# Patient Record
Sex: MALE | Race: BLACK OR AFRICAN AMERICAN | NOT HISPANIC OR LATINO | Employment: OTHER | URBAN - METROPOLITAN AREA
[De-identification: names, ages, dates, MRNs, and addresses within clinical notes are randomized per-mention and may not be internally consistent; named-entity substitution may affect disease eponyms.]

---

## 2020-10-27 ENCOUNTER — HOSPITAL ENCOUNTER (EMERGENCY)
Facility: HOSPITAL | Age: 55
Discharge: HOME | End: 2020-10-27
Attending: EMERGENCY MEDICINE
Payer: MEDICARE

## 2020-10-27 VITALS
BODY MASS INDEX: 35.65 KG/M2 | DIASTOLIC BLOOD PRESSURE: 80 MMHG | WEIGHT: 249 LBS | HEART RATE: 80 BPM | HEIGHT: 70 IN | TEMPERATURE: 99 F | OXYGEN SATURATION: 100 % | RESPIRATION RATE: 16 BRPM | SYSTOLIC BLOOD PRESSURE: 138 MMHG

## 2020-10-27 DIAGNOSIS — H10.9 CONJUNCTIVITIS OF LEFT EYE, UNSPECIFIED CONJUNCTIVITIS TYPE: ICD-10-CM

## 2020-10-27 DIAGNOSIS — H21.562 PUPIL IRREGULAR OF LEFT EYE: Primary | ICD-10-CM

## 2020-10-27 PROCEDURE — 99284 PR EMERGENCY DEPT VISIT,LEVEL IV: ICD-10-PCS | Mod: GC,,, | Performed by: EMERGENCY MEDICINE

## 2020-10-27 PROCEDURE — 99284 EMERGENCY DEPT VISIT MOD MDM: CPT | Mod: GC,,, | Performed by: EMERGENCY MEDICINE

## 2020-10-27 PROCEDURE — 99284 EMERGENCY DEPT VISIT MOD MDM: CPT

## 2020-10-27 RX ORDER — TETRACAINE HYDROCHLORIDE 5 MG/ML
2 SOLUTION OPHTHALMIC
Status: DISCONTINUED | OUTPATIENT
Start: 2020-10-27 | End: 2020-10-27 | Stop reason: HOSPADM

## 2020-10-27 NOTE — DISCHARGE INSTRUCTIONS
Please see your ophthalmologist as soon as possible. Today it looks like your eye was irritated which should slowly improve with the drops prescribed to you today.

## 2020-10-27 NOTE — CONSULTS
Chief complaint/Reason for Consult: abnormal eye exam     History of Present Illness: Dallas Lawson Jr. is a 54 y.o. male with unknown past ocular hx, transferred from [place] for abnormal eye exam. Patient awoke this morning with FBS in L eye and tried to rinse out eye with water. Later that morning, his niece noticed his eye was red and he presented to [facility] ED for evaluation. At outside hospital, L pupil found to be fixed, irregular, dilated. Other than redness and slight irritation, patient denies pain. No sick contacts with pink eye. Vision is unchanged from baseline although is poor (CF // HM). Patient denies any other ocular or visual complaints. Does not use any eye drops. Does not know when his last eye exam was performed. Possibly follows with a Dr. Nash in the community.     Past Ocular Hx: no past ocular surgeries, does not use glasses or contacts     Current eye gtts: none      PMHx:  has a past medical history of Diabetes mellitus type I.     PSurgHx:  has no past surgical history on file.     Home Medications:   Prior to Admission medications    Medication Sig Start Date End Date Taking? Authorizing Provider   azithromycin (Z-NENO) 250 MG tablet Take 1 tablet (250 mg total) by mouth once daily. 1 pack, take as directed 1/2/15   José Antonio Bassett MD   capsaicin (ZOSTRIX) 0.025 % cream Apply topically 2 (two) times daily. 4/29/14   José Antonio Bassett MD   etodolac (LODINE) 400 MG tablet Take 1 tablet (400 mg total) by mouth 2 (two) times daily. 8/8/14   José Antonio Bassett MD   fluoxetine (PROZAC) 20 MG tablet Take 20 mg by mouth once daily.    Historical Provider   ibuprofen (ADVIL,MOTRIN) 800 MG tablet Take 800 mg by mouth every 6 (six) hours as needed.    Historical Provider   levothyroxine (SYNTHROID) 50 MCG tablet Take 1 tablet (50 mcg total) by mouth once daily. 8/8/14 8/8/15  José Antonio Bassett MD   methocarbamol (ROBAXIN) 750 MG Tab Take 1 tablet (750 mg total) by mouth 2 (two) times daily.  8/8/14   José Antonio Bassett MD   naproxen (NAPROSYN) 500 MG tablet Take 1 tablet (500 mg total) by mouth 2 (two) times daily. 12/10/14   Chau Centeno MD   NASONEX 50 mcg/actuation nasal spray 2 SPRAYS BY NASAL ROUTE ONCE DAILY. X 7 DAYS, THEN 1 SPRAY EACH NOSTRIL DAILY 12/30/14   José Antonio Bassett MD   omeprazole (PRILOSEC) 20 MG capsule Take 1 capsule (20 mg total) by mouth once daily. 8/8/14   José Antonio Bassett MD   quetiapine (SEROQUEL) 25 MG Tab Take 1 tablet (25 mg total) by mouth daily as needed. 8/8/14   José Antonio Bassett MD   ranitidine (ZANTAC) 150 MG capsule Take 150 mg by mouth 2 (two) times daily.    Historical Provider   tizanidine (ZANAFLEX) 4 MG tablet Take 1 tablet (4 mg total) by mouth every 8 (eight) hours as needed (Muscle spasm). DO NOT DRIVE AFTER TAKING MEDICATION 12/10/14   Chau Centeno MD   tramadol (ULTRAM) 50 mg tablet Take 1 tablet (50 mg total) by mouth every 6 (six) hours as needed for Pain. 12/10/14   Chau Centeno MD        Medications this encounter:    fluorescein  1 strip Both Eyes ED 1 Time    tetracaine HCl (PF)  2 drop Both Eyes ED 1 Time       Allergies: has No Known Allergies.     Social Hx:  reports that he has been smoking. He does not have any smokeless tobacco history on file. He reports that he does not drink alcohol or use drugs.     Family Hx: No family history of glaucoma. family history is not on file.     ROS: Negative x 10 except for complaints as described in HPI; negative for fever, chills, weight loss, nausea, vomiting, diarrhea, shortness of breath, nasal discharge, cough, abdominal pain, dyspnea, difficulty moving arms and legs, confusion, dysuria, palpitations, or chest pain     Ocular examination/Dilated fundus examination:  Base Eye Exam     Visual Acuity       Right Left    Near sc CF at 2 ft  HM          Tonometry (Applanation, 3:41 PM)       Right Left    Pressure 26 29          Pupils       Dark Light Shape React APD    Right 3 2 Round 2 apd by reverse    Left 6 6 Irregular  Minimal None   Possible inferior iris tear           Visual Fields       Right Left     Full Full          Extraocular Movement       Right Left      sl esotropia      -1 -1 -1   -1  -1   -1 -1 -1    -1 -1 -1   -1  -1   -1 -1 -1             Dilation     Both eyes: 1% Mydriacyl, 2.5% Phenylephrine @ 3:41 PM            Slit Lamp and Fundus Exam     External Exam       Right Left    External Normal Normal          Slit Lamp Exam       Right Left    Lids/Lashes Normal scant discharge    Conjunctiva/Sclera Trace Injection 1+ Injection    Cornea inferior PEEs, tear film debris  inferior PEEs, tear film debris     Anterior Chamber Deep and quiet Deep and quiet    Iris Round and reactive irregular fixed    Lens Trace Nuclear sclerosis, Cortical cataract Trace Nuclear sclerosis, Cortical cataract    Vitreous Normal Normal          Fundus Exam       Right Left    Disc tilted, pale tilted, pale     Macula Extensive, patchy chorioretinal atrophy with pigmentary changes encompassing majority of posterior pole Extensive, patchy chorioretinal atrophy with pigmentary changes encompassing majority of posterior pole    Vessels attenuated  attenuated     Periphery no holes, tears, detachments  no holes, tears, detachments                 Assessment/Plan:     1. Conjunctivitis, L eye   -patient with mild FBS, redness in L eye noticed by family members earlier today  -on exam, slight conj injection with scant discharge, no lesions of the cornea   -hx and exam most consistent with conjunctivitis, most likely viral   -please have patient begin preservative free artifical tears qid L eye for comfort  -advise patient to practice good hygiene, avoid touching face, frequent hand washing, no sharing towel  -will have patient return to eye clinic in 1-2 weeks to check for resolution of symptoms/establish eye care    2. Irregular pupil, L eye   - patient with irregular, dilated, minimally reactive pupil on examination first noticed in outside  hospital  -no headache, no ptosis, no 3rd nerve palsy. Patient noting possible remote trauma to eye in past, likely iris tear  -no need for further work up imaging at this time, likely chronic, will continue to follow     3. Pathologic myopia, both eyes   -patient with significant chorioretinal atrophy and tilted disc both eyes   -most likely pathologic myopia, other degenerative changes possible   -further work up outpt     4. Ocular hypertension possible glaucoma suspect, both eyes   -IOP 26//29 on examination today  -AA, unknown family hx. Not currently on drops   -recommend starting timolol bid ou and work up for glaucoma outpt    Discussed patient's history, physical, assessment and plan with Dr. Marty Montez MD  [employer] [facility] PGY-2

## 2020-10-27 NOTE — ED NOTES
Dallas Lawson Jr., an 54 y.o. male presents to the ED for an ophthalmology consult.  Patient woke up this morning with a blister on his left eyeball.  Patient is blind at baseline, only able to make out silhouettes with his left eye but now he can't see anything.        Review of patient's allergies indicates:  No Known Allergies  Chief Complaint   Patient presents with    Transfer     [facility] consult     Past Medical History:   Diagnosis Date    Diabetes mellitus type I

## 2020-10-27 NOTE — ED PROVIDER NOTES
Encounter Date: 10/27/2020       History     Chief Complaint   Patient presents with    Transfer     [facility] optho consult     HPI   55 yo man with hx of diabetes type 1 and poor vision bilaterally presenting with change in appearance of L eye since this morning. Pt woke up this morning around 4 am and felt mild discomfort of the L eye that has persisted. Niece told patient that his eye looked funny and so took him to [facility]. At [facility] noted to haven nonreactive, dilated, irregular pupil with discharge. Baseline vision in L eye is to see motion and light and pt says this remains unchanged. Baseline vision in R eye is worse, maybe sees motion sometimes. R eye vision unchanged as well. No recent symptoms such as cold, fevers, chills, nasal conjunction, ear pain. No hx of glaucoma. No nausea, vomiting, abd pain, head pain.   Review of patient's allergies indicates:  No Known Allergies  Past Medical History:   Diagnosis Date    Diabetes mellitus type I      No past surgical history on file.  No family history on file.  Social History     Tobacco Use    Smoking status: Current Every Day Smoker   Substance Use Topics    Alcohol use: No    Drug use: No     Review of Systems   Constitutional: Negative for chills and fever.   HENT: Negative for congestion and sore throat.    Eyes: Positive for pain (mild) and discharge. Negative for photophobia, redness, itching and visual disturbance.   Respiratory: Negative for cough and shortness of breath.    Cardiovascular: Negative for chest pain, palpitations and leg swelling.   Gastrointestinal: Negative for blood in stool, constipation, diarrhea, nausea and vomiting.   Genitourinary: Negative for dysuria, frequency and hematuria.   Musculoskeletal: Negative for back pain and neck stiffness.   Skin: Negative for rash and wound.   Neurological: Negative for dizziness, tremors, seizures, syncope, facial asymmetry, speech difficulty, weakness, light-headedness, numbness and  headaches.       Physical Exam     Initial Vitals [10/27/20 1415]   BP Pulse Resp Temp SpO2   (!) 142/92 88 16 98.6 °F (37 °C) 100 %      MAP       --         Physical Exam    Nursing note and vitals reviewed.  Constitutional: He appears well-developed and well-nourished. He is not diaphoretic.   Answering questions in full sentences without increased work of breathing.    HENT:   Head: Normocephalic.   Eyes: EOM are normal. Right eye exhibits no discharge. Left eye exhibits no discharge. No scleral icterus.   Erythematous conjunctiva L eye. Dilated, irregular pupil 7mm, nonreactive. Mild amount of cloudy discharge from L eye. R eye 3mm round pupil, reactive, conjunctiva clear. Pt able to count fingers and see light easily with both eyes (pt reports this is at baseline).    Neck: Normal range of motion. Neck supple. No tracheal deviation present.   Cardiovascular: Normal rate and regular rhythm. Exam reveals no gallop and no friction rub.    No murmur heard.  Pulmonary/Chest: No stridor. No respiratory distress. He has no wheezes. He has no rhonchi. He has no rales. He exhibits no tenderness.   Abdominal: Soft. Bowel sounds are normal. He exhibits no distension. There is no abdominal tenderness. There is no rebound and no guarding.   Musculoskeletal: No edema.   Neurological: He is alert and oriented to person, place, and time. GCS score is 15. GCS eye subscore is 4. GCS verbal subscore is 5. GCS motor subscore is 6.   Moving all extremities   Skin: Skin is warm and dry. Capillary refill takes less than 2 seconds.         ED Course   Procedures  Labs Reviewed - No data to display       Imaging Results    None          Medical Decision Making:   History:   I obtained history from: someone other than patient.       <> Summary of History: Niece, EMS, mother  Old Medical Records: I decided to obtain old medical records.  Old Records Summarized: records from another hospital.  Initial Assessment:   53 yo hemodynamically  "stable pt with irregular, dilated, nonreactive pupil and erythematous conjunctivitis L eye. Pt reports waking up with discomfort and washing and rubbing eye this morning. Niece also reporting that pt has been "messing with eye all morning".   Differential Diagnosis:   Glaucoma, pre-septal vs orbital cellulitis, conjunctivitis, isolated cranial nerve deficit secondary to intracranial pathology (e.g. mass/bleeding), corneal abrasion   ED Management:  Optho called when patient arrived. Optho recommending natural tears for conjunctivitis. They believe the abnormalities pt has in his eye are chronic other than conjunctivitis. Erythema, irritation may have been exacerbated by pt washing out/rubbing eye this morning. Educated on using only tear drops when eye irritated. Pt will call his eye doctor to have eye re-assessed by this physician who will know his baseline as well.   Other:   I have discussed this case with another health care provider.                             Clinical Impression:     ICD-10-CM ICD-9-CM   1. Pupil irregular of left eye  H21.562 364.75   2. Conjunctivitis of left eye, unspecified conjunctivitis type  H10.9 372.30                          ED Disposition Condition    Discharge Stable        ED Prescriptions     Medication Sig Dispense Start Date End Date Auth. Provider    dextran 70-hypromellose (TEARS) ophthalmic solution  (Status: Discontinued) Place 1 drop into the left eye as needed. 30 mL 10/27/2020 10/27/2020 Carlene Dorsey MD    dextran 70-hypromellose (TEARS) ophthalmic solution Place 1 drop into the left eye as needed. 30 mL 10/27/2020  Nata Che MD        Follow-up Information     Follow up With Specialties Details Why Contact Info Additional Information    [facility] [address] Ophthalmology Schedule an appointment as soon as possible for a visit   [address]  [facility] [address] [phone] Please arrive on the 1st floor near financial services " for check-in    [facility] Emergency Medicine Go to  Return to an emergency department immediately if you develop persisting or worsening symptoms or with any new symptoms such as fevers, chills, inability to eat or drink, uncontrollable pain, headaches, chagnes in vision, nausea, vomiting, stomach pain [address] [facility] [facility] [address]  [phone]                                        Nata Che MD  Resident  10/27/20 4654

## 2021-03-13 ENCOUNTER — IMMUNIZATION (OUTPATIENT)
Dept: PRIMARY CARE CLINIC | Facility: CLINIC | Age: 56
End: 2021-03-13
Payer: MEDICARE

## 2021-03-13 DIAGNOSIS — Z23 NEED FOR VACCINATION: Primary | ICD-10-CM

## 2021-03-13 PROCEDURE — 0001A PR IMMUNIZ ADMIN, SARS-COV-2 COVID-19 VACC, 30MCG/0.3ML, 1ST DOSE: CPT | Mod: CV19,S$GLB,, | Performed by: INTERNAL MEDICINE

## 2021-03-13 PROCEDURE — 91300 PR SARS-COV- 2 COVID-19 VACCINE, NO PRSV, 30MCG/0.3ML, IM: CPT | Mod: S$GLB,,, | Performed by: INTERNAL MEDICINE

## 2021-03-13 PROCEDURE — 91300 PR SARS-COV- 2 COVID-19 VACCINE, NO PRSV, 30MCG/0.3ML, IM: ICD-10-PCS | Mod: S$GLB,,, | Performed by: INTERNAL MEDICINE

## 2021-03-13 PROCEDURE — 0001A PR IMMUNIZ ADMIN, SARS-COV-2 COVID-19 VACC, 30MCG/0.3ML, 1ST DOSE: ICD-10-PCS | Mod: CV19,S$GLB,, | Performed by: INTERNAL MEDICINE

## 2021-03-13 RX ADMIN — Medication 0.3 ML: at 12:03

## 2023-08-10 ENCOUNTER — LAB VISIT (OUTPATIENT)
Dept: LAB | Facility: HOSPITAL | Age: 58
End: 2023-08-10
Attending: FAMILY MEDICINE
Payer: MEDICARE

## 2023-08-10 ENCOUNTER — OFFICE VISIT (OUTPATIENT)
Dept: FAMILY MEDICINE | Facility: CLINIC | Age: 58
End: 2023-08-10
Payer: MEDICARE

## 2023-08-10 VITALS
BODY MASS INDEX: 37 KG/M2 | TEMPERATURE: 99 F | HEIGHT: 69 IN | SYSTOLIC BLOOD PRESSURE: 122 MMHG | OXYGEN SATURATION: 96 % | HEART RATE: 66 BPM | DIASTOLIC BLOOD PRESSURE: 78 MMHG | RESPIRATION RATE: 16 BRPM | WEIGHT: 249.81 LBS

## 2023-08-10 DIAGNOSIS — Z86.39 HISTORY OF HYPOTHYROIDISM: ICD-10-CM

## 2023-08-10 DIAGNOSIS — M21.42 FLAT FEET, BILATERAL: ICD-10-CM

## 2023-08-10 DIAGNOSIS — R25.2 LEG CRAMPS: ICD-10-CM

## 2023-08-10 DIAGNOSIS — J31.0 CHRONIC RHINITIS: ICD-10-CM

## 2023-08-10 DIAGNOSIS — E78.5 DYSLIPIDEMIA: ICD-10-CM

## 2023-08-10 DIAGNOSIS — E66.9 TYPE 2 DIABETES MELLITUS WITH OBESITY: Primary | ICD-10-CM

## 2023-08-10 DIAGNOSIS — E11.69 TYPE 2 DIABETES MELLITUS WITH OBESITY: Primary | ICD-10-CM

## 2023-08-10 DIAGNOSIS — L60.3 ONYCHODYSTROPHY: ICD-10-CM

## 2023-08-10 DIAGNOSIS — E66.9 TYPE 2 DIABETES MELLITUS WITH OBESITY: ICD-10-CM

## 2023-08-10 DIAGNOSIS — M21.41 FLAT FEET, BILATERAL: ICD-10-CM

## 2023-08-10 DIAGNOSIS — Z13.5 SCREENING FOR DIABETIC RETINOPATHY: ICD-10-CM

## 2023-08-10 DIAGNOSIS — J34.2 NASAL SEPTAL DEVIATION: ICD-10-CM

## 2023-08-10 DIAGNOSIS — E11.69 TYPE 2 DIABETES MELLITUS WITH OBESITY: ICD-10-CM

## 2023-08-10 LAB
ALBUMIN SERPL BCP-MCNC: 4.3 G/DL (ref 3.5–5.2)
ALP SERPL-CCNC: 55 U/L (ref 55–135)
ALT SERPL W/O P-5'-P-CCNC: 66 U/L (ref 10–44)
ANION GAP SERPL CALC-SCNC: 7 MMOL/L (ref 8–16)
AST SERPL-CCNC: 42 U/L (ref 10–40)
BASOPHILS # BLD AUTO: 0.05 K/UL (ref 0–0.2)
BASOPHILS NFR BLD: 1 % (ref 0–1.9)
BILIRUB SERPL-MCNC: 0.6 MG/DL (ref 0.1–1)
BUN SERPL-MCNC: 18 MG/DL (ref 6–20)
CALCIUM SERPL-MCNC: 10.1 MG/DL (ref 8.7–10.5)
CHLORIDE SERPL-SCNC: 104 MMOL/L (ref 95–110)
CHOLEST SERPL-MCNC: 180 MG/DL (ref 120–199)
CHOLEST/HDLC SERPL: 3.9 {RATIO} (ref 2–5)
CO2 SERPL-SCNC: 31 MMOL/L (ref 23–29)
CREAT SERPL-MCNC: 1.2 MG/DL (ref 0.5–1.4)
DIFFERENTIAL METHOD: ABNORMAL
EOSINOPHIL # BLD AUTO: 0.2 K/UL (ref 0–0.5)
EOSINOPHIL NFR BLD: 3 % (ref 0–8)
ERYTHROCYTE [DISTWIDTH] IN BLOOD BY AUTOMATED COUNT: 14.3 % (ref 11.5–14.5)
EST. GFR  (NO RACE VARIABLE): >60 ML/MIN/1.73 M^2
GLUCOSE SERPL-MCNC: 103 MG/DL (ref 70–110)
GLUCOSE SERPL-MCNC: 108 MG/DL (ref 70–110)
HCT VFR BLD AUTO: 46.8 % (ref 40–54)
HDLC SERPL-MCNC: 46 MG/DL (ref 40–75)
HDLC SERPL: 25.6 % (ref 20–50)
HGB BLD-MCNC: 14.5 G/DL (ref 14–18)
IMM GRANULOCYTES # BLD AUTO: 0.01 K/UL (ref 0–0.04)
IMM GRANULOCYTES NFR BLD AUTO: 0.2 % (ref 0–0.5)
LDLC SERPL CALC-MCNC: 118.4 MG/DL (ref 63–159)
LYMPHOCYTES # BLD AUTO: 2.5 K/UL (ref 1–4.8)
LYMPHOCYTES NFR BLD: 48.5 % (ref 18–48)
MAGNESIUM SERPL-MCNC: 2.1 MG/DL (ref 1.6–2.6)
MCH RBC QN AUTO: 29.1 PG (ref 27–31)
MCHC RBC AUTO-ENTMCNC: 31 G/DL (ref 32–36)
MCV RBC AUTO: 94 FL (ref 82–98)
MONOCYTES # BLD AUTO: 0.6 K/UL (ref 0.3–1)
MONOCYTES NFR BLD: 11.7 % (ref 4–15)
NEUTROPHILS # BLD AUTO: 1.8 K/UL (ref 1.8–7.7)
NEUTROPHILS NFR BLD: 35.6 % (ref 38–73)
NONHDLC SERPL-MCNC: 134 MG/DL
NRBC BLD-RTO: 0 /100 WBC
PHOSPHATE SERPL-MCNC: 3.4 MG/DL (ref 2.7–4.5)
PLATELET # BLD AUTO: 198 K/UL (ref 150–450)
PMV BLD AUTO: 11.6 FL (ref 9.2–12.9)
POTASSIUM SERPL-SCNC: 4.8 MMOL/L (ref 3.5–5.1)
PROT SERPL-MCNC: 8.1 G/DL (ref 6–8.4)
RBC # BLD AUTO: 4.99 M/UL (ref 4.6–6.2)
SODIUM SERPL-SCNC: 142 MMOL/L (ref 136–145)
TRIGL SERPL-MCNC: 78 MG/DL (ref 30–150)
TSH SERPL DL<=0.005 MIU/L-ACNC: 1.55 UIU/ML (ref 0.4–4)
WBC # BLD AUTO: 5.05 K/UL (ref 3.9–12.7)

## 2023-08-10 PROCEDURE — 3044F PR MOST RECENT HEMOGLOBIN A1C LEVEL <7.0%: ICD-10-PCS | Mod: CPTII,S$GLB,, | Performed by: FAMILY MEDICINE

## 2023-08-10 PROCEDURE — 87101 SKIN FUNGI CULTURE: CPT | Performed by: FAMILY MEDICINE

## 2023-08-10 PROCEDURE — 3008F BODY MASS INDEX DOCD: CPT | Mod: CPTII,S$GLB,, | Performed by: FAMILY MEDICINE

## 2023-08-10 PROCEDURE — 99999 PR PBB SHADOW E&M-EST. PATIENT-LVL V: ICD-10-PCS | Mod: PBBFAC,,, | Performed by: FAMILY MEDICINE

## 2023-08-10 PROCEDURE — 99204 OFFICE O/P NEW MOD 45 MIN: CPT | Mod: S$GLB,,, | Performed by: FAMILY MEDICINE

## 2023-08-10 PROCEDURE — 99999 PR PBB SHADOW E&M-EST. PATIENT-LVL V: CPT | Mod: PBBFAC,,, | Performed by: FAMILY MEDICINE

## 2023-08-10 PROCEDURE — 82962 GLUCOSE BLOOD TEST: CPT | Mod: S$GLB,,, | Performed by: FAMILY MEDICINE

## 2023-08-10 PROCEDURE — 84100 ASSAY OF PHOSPHORUS: CPT | Performed by: FAMILY MEDICINE

## 2023-08-10 PROCEDURE — 1160F PR REVIEW ALL MEDS BY PRESCRIBER/CLIN PHARMACIST DOCUMENTED: ICD-10-PCS | Mod: CPTII,S$GLB,, | Performed by: FAMILY MEDICINE

## 2023-08-10 PROCEDURE — 3044F HG A1C LEVEL LT 7.0%: CPT | Mod: CPTII,S$GLB,, | Performed by: FAMILY MEDICINE

## 2023-08-10 PROCEDURE — 80053 COMPREHEN METABOLIC PANEL: CPT | Performed by: FAMILY MEDICINE

## 2023-08-10 PROCEDURE — 83036 HEMOGLOBIN GLYCOSYLATED A1C: CPT | Performed by: FAMILY MEDICINE

## 2023-08-10 PROCEDURE — 3078F DIAST BP <80 MM HG: CPT | Mod: CPTII,S$GLB,, | Performed by: FAMILY MEDICINE

## 2023-08-10 PROCEDURE — 99204 PR OFFICE/OUTPT VISIT, NEW, LEVL IV, 45-59 MIN: ICD-10-PCS | Mod: S$GLB,,, | Performed by: FAMILY MEDICINE

## 2023-08-10 PROCEDURE — 82962 POCT GLUCOSE, HAND-HELD DEVICE: ICD-10-PCS | Mod: S$GLB,,, | Performed by: FAMILY MEDICINE

## 2023-08-10 PROCEDURE — 3074F SYST BP LT 130 MM HG: CPT | Mod: CPTII,S$GLB,, | Performed by: FAMILY MEDICINE

## 2023-08-10 PROCEDURE — 3008F PR BODY MASS INDEX (BMI) DOCUMENTED: ICD-10-PCS | Mod: CPTII,S$GLB,, | Performed by: FAMILY MEDICINE

## 2023-08-10 PROCEDURE — 3074F PR MOST RECENT SYSTOLIC BLOOD PRESSURE < 130 MM HG: ICD-10-PCS | Mod: CPTII,S$GLB,, | Performed by: FAMILY MEDICINE

## 2023-08-10 PROCEDURE — 1160F RVW MEDS BY RX/DR IN RCRD: CPT | Mod: CPTII,S$GLB,, | Performed by: FAMILY MEDICINE

## 2023-08-10 PROCEDURE — 85025 COMPLETE CBC W/AUTO DIFF WBC: CPT | Performed by: FAMILY MEDICINE

## 2023-08-10 PROCEDURE — 3078F PR MOST RECENT DIASTOLIC BLOOD PRESSURE < 80 MM HG: ICD-10-PCS | Mod: CPTII,S$GLB,, | Performed by: FAMILY MEDICINE

## 2023-08-10 PROCEDURE — 36415 COLL VENOUS BLD VENIPUNCTURE: CPT | Mod: PN | Performed by: FAMILY MEDICINE

## 2023-08-10 PROCEDURE — 84443 ASSAY THYROID STIM HORMONE: CPT | Performed by: FAMILY MEDICINE

## 2023-08-10 PROCEDURE — 1159F PR MEDICATION LIST DOCUMENTED IN MEDICAL RECORD: ICD-10-PCS | Mod: CPTII,S$GLB,, | Performed by: FAMILY MEDICINE

## 2023-08-10 PROCEDURE — 83735 ASSAY OF MAGNESIUM: CPT | Performed by: FAMILY MEDICINE

## 2023-08-10 PROCEDURE — 80061 LIPID PANEL: CPT | Performed by: FAMILY MEDICINE

## 2023-08-10 PROCEDURE — 1159F MED LIST DOCD IN RCRD: CPT | Mod: CPTII,S$GLB,, | Performed by: FAMILY MEDICINE

## 2023-08-10 RX ORDER — LORATADINE 10 MG/1
1 TABLET ORAL DAILY
COMMUNITY
End: 2024-03-27 | Stop reason: SDUPTHER

## 2023-08-10 RX ORDER — METFORMIN HYDROCHLORIDE 500 MG/1
1 TABLET ORAL 2 TIMES DAILY WITH MEALS
COMMUNITY
End: 2024-03-27 | Stop reason: SDUPTHER

## 2023-08-10 RX ORDER — FLUTICASONE PROPIONATE 50 MCG
2 SPRAY, SUSPENSION (ML) NASAL DAILY
Qty: 16 ML | Refills: 11 | Status: SHIPPED | OUTPATIENT
Start: 2023-08-10 | End: 2024-03-27 | Stop reason: SDUPTHER

## 2023-08-10 RX ORDER — TRAZODONE HYDROCHLORIDE 50 MG/1
1 TABLET ORAL NIGHTLY
COMMUNITY
End: 2023-08-10

## 2023-08-10 RX ORDER — AZELASTINE 1 MG/ML
1 SPRAY, METERED NASAL 2 TIMES DAILY
Qty: 30 ML | Refills: 0 | Status: SHIPPED | OUTPATIENT
Start: 2023-08-10 | End: 2023-09-09

## 2023-08-10 RX ORDER — ATORVASTATIN CALCIUM 20 MG/1
1 TABLET, FILM COATED ORAL DAILY
COMMUNITY
End: 2024-03-27 | Stop reason: SDUPTHER

## 2023-08-10 NOTE — PATIENT INSTRUCTIONS
In Morning  Flonase 2 sprays per nostril wait 1 minute then   Astelin 2 sprays per nostril  In the evening  Astelin 2 sprays per nostril    Do above for a month then you can stop Astelin but continue Flonase to prevent symptoms from returning. If symptoms return, restart Astelin

## 2023-08-11 LAB
ESTIMATED AVG GLUCOSE: 131 MG/DL (ref 68–131)
HBA1C MFR BLD: 6.2 % (ref 4–5.6)

## 2023-08-12 NOTE — PROGRESS NOTES
"  Patient Name: aDllas Lawson Jr.    : 1965  MRN: [id number]      Subjective:     Patient ID: Dallas is a 57 y.o. male    Chief Complaint:  Establish Care    57-year-old male comes in with his mother to establish care he has diabetes, dyslipidemia, and history of a chronic rhinitis and previously treated for hypothyroidism.  They bring his medications in with him.  The patient is blind on both sides, and he started losing his vision in childhood and completely lost vision a few years ago.  He reports chronic leg cramps.  He also reports previous treatment for fungal toenail.         Review of Systems   Constitutional:  Negative for unexpected weight change.   HENT:  Negative for ear pain and sore throat.    Eyes:  Positive for visual disturbance.   Respiratory:  Negative for shortness of breath.    Cardiovascular:  Negative for chest pain.   Gastrointestinal:  Negative for abdominal pain and blood in stool.   Endocrine: Negative for cold intolerance and heat intolerance.   Genitourinary:  Negative for dysuria and frequency.   Musculoskeletal:  Positive for back pain, gait problem, leg pain and myalgias.   Integumentary:  Negative for rash.   Neurological:  Negative for weakness, numbness and headaches.   Hematological:  Negative for adenopathy.   Psychiatric/Behavioral:  Negative for suicidal ideas.         Objective:   /78 (BP Location: Left arm, Patient Position: Sitting, BP Method: Large (Manual))   Pulse 66   Temp 99 °F (37.2 °C) (Oral)   Resp 16   Ht 5' 8.5" (1.74 m)   Wt 113.3 kg (249 lb 12.5 oz)   SpO2 96%   BMI 37.43 kg/m²     Physical Exam  Vitals reviewed.   Constitutional:       General: He is not in acute distress.  HENT:      Head: Normocephalic and atraumatic.      Right Ear: Ear canal and external ear normal.      Left Ear: Ear canal and external ear normal.      Nose: Nose normal.      Mouth/Throat:      Mouth: Mucous membranes are moist.      Pharynx: No oropharyngeal exudate or " posterior oropharyngeal erythema.   Eyes:      Extraocular Movements: Extraocular movements intact.      Conjunctiva/sclera: Conjunctivae normal.      Pupils: Pupils are equal, round, and reactive to light.   Cardiovascular:      Rate and Rhythm: Normal rate and regular rhythm.      Pulses: Normal pulses.      Heart sounds: Normal heart sounds.   Pulmonary:      Effort: Pulmonary effort is normal. No respiratory distress.      Breath sounds: No wheezing or rales.   Abdominal:      General: Abdomen is flat. Bowel sounds are normal. There is no distension.      Palpations: Abdomen is soft.      Tenderness: There is no abdominal tenderness. There is no guarding.   Musculoskeletal:      Cervical back: Normal range of motion. No rigidity or tenderness.   Lymphadenopathy:      Cervical: No cervical adenopathy.   Skin:     General: Skin is warm.      Capillary Refill: Capillary refill takes less than 2 seconds.   Neurological:      Mental Status: He is alert and oriented to person, place, and time.      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      Sensory: No sensory deficit.   Psychiatric:         Mood and Affect: Mood normal.         Behavior: Behavior normal.      Protective Sensation (w/ 10 gram monofilament):  Right: Intact  Left: Intact    Visual Inspection:  Onychomycosis -  Right great toenail and bilateral pes planus    Pedal Pulses:   Right: Present  Left: Present    Posterior Tibialis Pulses:   Right:Present  Left: Present   Assessment        ICD-10-CM ICD-9-CM   1. Type 2 diabetes mellitus with obesity  E11.69 250.00    E66.9 278.00   2. Onychodystrophy  L60.3 703.8   3. Dyslipidemia  E78.5 272.4   4. Flat feet, bilateral  M21.41 734    M21.42    5. Chronic rhinitis  J31.0 472.0   6. Nasal septal deviation  J34.2 470   7. History of hypothyroidism  Z86.39 V12.29   8. Leg cramps  R25.2 729.82   9. Screening for diabetic retinopathy  Z13.5 V80.2         Plan:     1. Type 2 diabetes  mellitus with obesity  -     POCT Glucose, Hand-Held Device  -     Microalbumin/creatinine urine ratio; Future; Expected date: 08/10/2023  -     CBC auto differential; Future; Expected date: 08/10/2023  -     Comprehensive metabolic panel; Future; Expected date: 08/10/2023  -     Hemoglobin A1c; Future; Expected date: 08/10/2023  -     Lipid panel; Future; Expected date: 08/10/2023  -     TSH; Future; Expected date: 08/10/2023  -     Ambulatory referral/consult to Ophthalmology; Future; Expected date: 08/17/2023  Check diabetic labs    2. Onychodystrophy  -     CULTURE, FUNGUS - SKIN, HAIR, OR NAILS  Culture collected    3. Dyslipidemia  -     Comprehensive metabolic panel; Future; Expected date: 08/10/2023  -     Lipid panel; Future; Expected date: 08/10/2023  -     TSH; Future; Expected date: 08/10/2023  Check lipid panel    4. Flat feet, bilateral  -     Ambulatory referral/consult to Podiatry; Future; Expected date: 08/17/2023  Referral to podiatry    5. Chronic rhinitis  -     fluticasone propionate (FLONASE) 50 mcg/actuation nasal spray; 2 sprays (100 mcg total) by Each Nostril route once daily.  Dispense: 16 mL; Refill: 11  -     azelastine (ASTELIN) 137 mcg (0.1 %) nasal spray; 1 spray (137 mcg total) by Nasal route 2 (two) times daily.  Dispense: 30 mL; Refill: 0  In Morning  Flonase 2 sprays per nostril wait 1 minute then   Astelin 2 sprays per nostril  In the evening  Astelin 2 sprays per nostril    Do above for a month then you can stop Astelin but continue Flonase to prevent symptoms from returning. If symptoms return, restart Astelin    6. Nasal septal deviation  -     fluticasone propionate (FLONASE) 50 mcg/actuation nasal spray; 2 sprays (100 mcg total) by Each Nostril route once daily.  Dispense: 16 mL; Refill: 11  -     azelastine (ASTELIN) 137 mcg (0.1 %) nasal spray; 1 spray (137 mcg total) by Nasal route 2 (two) times daily.  Dispense: 30 mL; Refill: 0    7. History of hypothyroidism  -     TSH;  Future; Expected date: 08/10/2023  Check TSH    8. Leg cramps  -     TSH; Future; Expected date: 08/10/2023  -     Magnesium; Future; Expected date: 08/10/2023  -     Phosphorus; Future; Expected date: 08/10/2023  Check labs    9. Screening for diabetic retinopathy  -     Ambulatory referral/consult to Ophthalmology; Future; Expected date: 08/17/2023           -Ananda Kwong Jr., MD, AAHIVS      This office note has been dictated.  This dictation has been generated using M-Modal Fluency Direct dictation; some phonetic errors may occur.         Patient Instructions   In Morning  Flonase 2 sprays per nostril wait 1 minute then   Astelin 2 sprays per nostril  In the evening  Astelin 2 sprays per nostril    Do above for a month then you can stop Astelin but continue Flonase to prevent symptoms from returning. If symptoms return, restart Astelin        Future Appointments   Date Time Provider Department Center   8/24/2023 11:00 AM Ananda Kwong Jr., MD [facility]   8/30/2023  2:30 PM Leena Tesfaye, DPM [facility] CHANELLE Agrawal

## 2023-08-24 ENCOUNTER — TELEPHONE (OUTPATIENT)
Dept: FAMILY MEDICINE | Facility: CLINIC | Age: 58
End: 2023-08-24
Payer: MEDICARE
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
## 2023-09-11 LAB — FUNGUS BLD CULT: NORMAL

## 2023-09-25 ENCOUNTER — OFFICE VISIT (OUTPATIENT)
Dept: PODIATRY | Facility: CLINIC | Age: 58
End: 2023-09-25
Payer: MEDICARE

## 2023-09-25 VITALS — HEIGHT: 69 IN | WEIGHT: 249.81 LBS | BODY MASS INDEX: 37 KG/M2

## 2023-09-25 DIAGNOSIS — M21.41 FLAT FEET: ICD-10-CM

## 2023-09-25 DIAGNOSIS — B35.1 ONYCHOMYCOSIS DUE TO DERMATOPHYTE: ICD-10-CM

## 2023-09-25 DIAGNOSIS — E11.9 TYPE 2 DIABETES MELLITUS WITHOUT COMPLICATION, UNSPECIFIED WHETHER LONG TERM INSULIN USE: Primary | ICD-10-CM

## 2023-09-25 DIAGNOSIS — M21.6X2 ACQUIRED EQUINUS DEFORMITY OF BOTH FEET: ICD-10-CM

## 2023-09-25 DIAGNOSIS — M21.42 FLAT FEET: ICD-10-CM

## 2023-09-25 DIAGNOSIS — M21.6X1 ACQUIRED EQUINUS DEFORMITY OF BOTH FEET: ICD-10-CM

## 2023-09-25 PROCEDURE — 99999 PR PBB SHADOW E&M-EST. PATIENT-LVL III: ICD-10-PCS | Mod: PBBFAC,HCNC,, | Performed by: PODIATRIST

## 2023-09-25 PROCEDURE — 99999 PR PBB SHADOW E&M-EST. PATIENT-LVL III: CPT | Mod: PBBFAC,HCNC,, | Performed by: PODIATRIST

## 2023-09-25 PROCEDURE — 3044F HG A1C LEVEL LT 7.0%: CPT | Mod: HCNC,CPTII,S$GLB, | Performed by: PODIATRIST

## 2023-09-25 PROCEDURE — 1159F MED LIST DOCD IN RCRD: CPT | Mod: HCNC,CPTII,S$GLB, | Performed by: PODIATRIST

## 2023-09-25 PROCEDURE — 3008F PR BODY MASS INDEX (BMI) DOCUMENTED: ICD-10-PCS | Mod: HCNC,CPTII,S$GLB, | Performed by: PODIATRIST

## 2023-09-25 PROCEDURE — 99204 OFFICE O/P NEW MOD 45 MIN: CPT | Mod: HCNC,S$GLB,, | Performed by: PODIATRIST

## 2023-09-25 PROCEDURE — 3044F PR MOST RECENT HEMOGLOBIN A1C LEVEL <7.0%: ICD-10-PCS | Mod: HCNC,CPTII,S$GLB, | Performed by: PODIATRIST

## 2023-09-25 PROCEDURE — 3008F BODY MASS INDEX DOCD: CPT | Mod: HCNC,CPTII,S$GLB, | Performed by: PODIATRIST

## 2023-09-25 PROCEDURE — 1159F PR MEDICATION LIST DOCUMENTED IN MEDICAL RECORD: ICD-10-PCS | Mod: HCNC,CPTII,S$GLB, | Performed by: PODIATRIST

## 2023-09-25 PROCEDURE — 99204 PR OFFICE/OUTPT VISIT, NEW, LEVL IV, 45-59 MIN: ICD-10-PCS | Mod: HCNC,S$GLB,, | Performed by: PODIATRIST

## 2023-09-25 RX ORDER — CICLOPIROX 80 MG/ML
SOLUTION TOPICAL NIGHTLY
Qty: 6.6 ML | Refills: 11 | Status: SHIPPED | OUTPATIENT
Start: 2023-09-25 | End: 2023-10-02 | Stop reason: SDUPTHER

## 2023-09-25 NOTE — PROGRESS NOTES
Subjective:      Patient ID: Dallas Lawson Jr. is a 57 y.o. male.    Chief Complaint: Diabetes Mellitus (8/10/23 Dr. disla), Nail Problem, and Flat Foot    Diabetes, increased risk amputation needing evaluation/management/optomization of foot care.    Cc2 thick dark discolored toenails .  Gradual onset, worsening over past several weeks, aggravated by increased weight bearing, shoe gear, pressure.  No previous medical treatment.  OTC pain med not helping.     Cc3 flat feet.  Chronically present life long.  Recent progression is Gradual onset, worsening over past several months, aggravated by increased weight bearing, shoe gear, pressure.  No previous medical treatment.  OTC pain med not helping.     Chief Complaint   Patient presents with    Diabetes Mellitus     8/10/23 Dr. disla    Nail Problem    Flat Foot     Casual shoes     Review of Systems   Constitutional: Negative for chills, diaphoresis, fever, malaise/fatigue and night sweats.   Cardiovascular:  Negative for claudication, cyanosis, leg swelling and syncope.   Skin:  Positive for nail changes. Negative for color change, dry skin, rash, suspicious lesions and unusual hair distribution.   Musculoskeletal:  Negative for falls, joint pain, joint swelling, muscle cramps, muscle weakness and stiffness.   Gastrointestinal:  Negative for constipation, diarrhea, nausea and vomiting.   Neurological:  Negative for brief paralysis, disturbances in coordination, focal weakness, numbness, paresthesias, sensory change and tremors.           Objective:      Physical Exam  Constitutional:       General: He is not in acute distress.     Appearance: He is well-developed. He is not diaphoretic.   Cardiovascular:      Pulses:           Popliteal pulses are 2+ on the right side and 2+ on the left side.        Dorsalis pedis pulses are 2+ on the right side and 2+ on the left side.        Posterior tibial pulses are 2+ on the right side and 2+ on the left side.      Comments:  Capillary refill 3 seconds all toes/distal feet, all toes/both feet warm to touch.      Negative lymphadenopathy bilateral popliteal fossa and tarsal tunnel.      Negavie lower extremity edema bilateral.    Musculoskeletal:      Right ankle: No swelling, deformity, ecchymosis or lacerations. Normal range of motion. Normal pulse.      Right Achilles Tendon: Normal. No defects. Ortiz's test negative.      Comments: Ankle dorsiflexion decreased at <10 degrees bilateral with moderate increase with knee flexion bilateral.    Hypermobile stj, mtj bilateral without gross deformity, loss of function, or signs acute trauma.     Otherwise, Normal angle, base, station of gait. All ten toes without clubbing, cyanosis, or signs of ischemia.  No pain to palpation bilateral lower extremities.  Range of motion, stability, muscle strength, and muscle tone normal bilateral feet and legs.    Lymphadenopathy:      Lower Body: No right inguinal adenopathy. No left inguinal adenopathy.      Comments: Negative lymphadenopathy bilateral popliteal fossa and tarsal tunnel.    Negative lymphangitic streaking bilateral feet/ankles/legs.   Skin:     General: Skin is warm and dry.      Capillary Refill: Capillary refill takes 2 to 3 seconds.      Coloration: Skin is not pale.      Findings: No abrasion, bruising, burn, ecchymosis, erythema, laceration, lesion or rash.      Nails: There is no clubbing.      Comments: Skin is normal age and health appropriate color, turgor, texture, and temperature bilateral lower extremities without ulceration, hyperpigmentation, discoloration, masses nodules or cords palpated.  No ecchymosis, erythema, edema, or cardinal signs of infection bilateral lower extremities.    Toenails 1st, 2nd, 3rd, 4th, 5th  bilateral are hypertrophic thickened 2-3 mm, dystrophic, discolored tanish brown with tan, gray crumbly subungual debris.  Tender to distal nail plate pressure, without periungual skin abnormality of  each.       Neurological:      Mental Status: He is alert and oriented to person, place, and time.      Sensory: No sensory deficit.      Motor: No tremor, atrophy or abnormal muscle tone.      Gait: Gait normal.      Comments: Negative tinel sign to percussion sural, superficial peroneal, deep peroneal, saphenous, and posterior tibial nerves right and left ankles and feet.     Psychiatric:         Behavior: Behavior is cooperative.             Assessment:       Encounter Diagnoses   Name Primary?    Type 2 diabetes mellitus without complication, unspecified whether long term insulin use Yes    Onychomycosis due to dermatophyte     Flat feet     Acquired equinus deformity of both feet          Plan:       Dallas was seen today for diabetes mellitus, nail problem and flat foot.    Diagnoses and all orders for this visit:    Type 2 diabetes mellitus without complication, unspecified whether long term insulin use    Onychomycosis due to dermatophyte    Flat feet    Acquired equinus deformity of both feet    Other orders  -     ciclopirox (PENLAC) 8 % Soln; Apply topically nightly.      I counseled the patient on his conditions, their implications and medical management.    The patient has received literature on basic diabetic foot care.  Patient will inspect feet daily, wear protective shoe gear when ambulatory, and apply moisturizer to skin as needed to maintain elasticity and help prevent ulceration.    Discussed conservative treatment with shoes of adequate dimensions, material, and style to alleviate symptoms and delay or prevent surgical intervention.    Patient will obtain over the counter arch supports and wear them in shoes whenever possible.  Athletic shoes intended for walking or running are usually best.    Patient will stretch the tendo achilles complex three times daily as demonstrated in the office.  Literature was dispensed illustrating proper stretching technique.      Non covered foot care:    With the  patient's permission, I debrided all ten toenails with a sterile nipper and curette, removing all offending nail and debris.  Patient tolerated the procedure well and related significant relief.            No follow-ups on file.

## 2023-10-02 RX ORDER — CICLOPIROX 80 MG/ML
SOLUTION TOPICAL NIGHTLY
Qty: 6.6 ML | Refills: 11 | Status: CANCELLED | OUTPATIENT
Start: 2023-10-02

## 2023-10-02 NOTE — TELEPHONE ENCOUNTER
Staff informed patient mother a refill message was sent to Dr. Maddox.    Eleanor mother verbalized understanding.

## 2023-10-03 RX ORDER — CICLOPIROX 80 MG/ML
SOLUTION TOPICAL NIGHTLY
Qty: 6.6 ML | Refills: 11 | Status: SHIPPED | OUTPATIENT
Start: 2023-10-03

## 2024-01-24 NOTE — TELEPHONE ENCOUNTER
Refill Routing Note   Medication(s) are not appropriate for processing by [facility] for the following reason(s):        No active prescription written by provider  Responsible provider unclear    [facility] action(s):  Defer               Appointments  past 12m or future 3m with PCP    Date Provider   Last Visit   8/10/2023 Ananda Kwong Jr., MD   Next Visit   Visit date not found Ananda Kwong Jr., MD   ED visits in past 90 days: 0        Note composed:5:13 PM 01/24/2024

## 2024-01-24 NOTE — TELEPHONE ENCOUNTER
----- Message from Deirdre Mera sent at 1/24/2024  2:44 PM CST -----  Type:  RX Refill Request    Who Called:  pt family     RX Name and Strength:  metFORMIN (GLUCOPHAGE) 500 MG tablet    How is the patient currently taking it? (ex. 1XDay): 2 x  a day     Is this a 30 day or 90 day RX: 90    Preferred Pharmacy with phone number: [facility]) - [place] [address] [address]   Phone: [phone]  Fax: [phone]          Local or Mail Order:  mail     Ordering Provider:  Dr Kwong     Would the patient rather a call back or a response via [facility]?  Call     Best Call Back Number: [phone]    Additional Information:  refill

## 2024-01-25 RX ORDER — METFORMIN HYDROCHLORIDE 500 MG/1
500 TABLET ORAL 2 TIMES DAILY WITH MEALS
Qty: 180 TABLET | Refills: 0 | OUTPATIENT
Start: 2024-01-25

## 2024-01-25 NOTE — TELEPHONE ENCOUNTER
Provider Staff:  Please note Refusal of medication.     Action required for this patient.      Requested Prescriptions     Refused Prescriptions Disp Refills    metFORMIN (GLUCOPHAGE) 500 MG tablet 180 tablet 0     Sig: Take 1 tablet (500 mg total) by mouth 2 (two) times daily with meals.     Refused By: PHYLLIS SAHU JR.     Reason for Refusal: Patient needs an appointment      Thanks!  [facility]   Note composed: 01/25/2024 1:39 PM            no breast tenderness L/no breast tenderness R/no breast lump L/no breast lump R

## 2024-03-18 ENCOUNTER — PATIENT MESSAGE (OUTPATIENT)
Dept: FAMILY MEDICINE | Facility: CLINIC | Age: 59
End: 2024-03-18
Payer: MEDICARE

## 2024-03-18 DIAGNOSIS — E66.9 TYPE 2 DIABETES MELLITUS WITH OBESITY: Primary | ICD-10-CM

## 2024-03-18 DIAGNOSIS — E11.69 TYPE 2 DIABETES MELLITUS WITH OBESITY: Primary | ICD-10-CM

## 2024-03-18 RX ORDER — METFORMIN HYDROCHLORIDE 500 MG/1
500 TABLET ORAL 2 TIMES DAILY WITH MEALS
Qty: 90 TABLET | Refills: 2 | Status: CANCELLED | OUTPATIENT
Start: 2024-03-18

## 2024-03-19 ENCOUNTER — TELEPHONE (OUTPATIENT)
Dept: FAMILY MEDICINE | Facility: CLINIC | Age: 59
End: 2024-03-19
Payer: MEDICARE

## 2024-03-19 DIAGNOSIS — E78.5 DYSLIPIDEMIA: ICD-10-CM

## 2024-03-19 DIAGNOSIS — E11.69 TYPE 2 DIABETES MELLITUS WITH OBESITY: Primary | ICD-10-CM

## 2024-03-19 DIAGNOSIS — Z11.4 ENCOUNTER FOR SCREENING FOR HIV: ICD-10-CM

## 2024-03-19 DIAGNOSIS — Z12.5 SCREENING FOR PROSTATE CANCER: ICD-10-CM

## 2024-03-19 DIAGNOSIS — Z11.59 NEED FOR HEPATITIS C SCREENING TEST: ICD-10-CM

## 2024-03-19 DIAGNOSIS — E66.9 TYPE 2 DIABETES MELLITUS WITH OBESITY: Primary | ICD-10-CM

## 2024-03-22 ENCOUNTER — LAB VISIT (OUTPATIENT)
Dept: LAB | Facility: HOSPITAL | Age: 59
End: 2024-03-22
Attending: FAMILY MEDICINE
Payer: MEDICARE

## 2024-03-22 DIAGNOSIS — Z11.4 ENCOUNTER FOR SCREENING FOR HIV: ICD-10-CM

## 2024-03-22 DIAGNOSIS — E78.5 DYSLIPIDEMIA: ICD-10-CM

## 2024-03-22 DIAGNOSIS — E11.69 TYPE 2 DIABETES MELLITUS WITH OBESITY: ICD-10-CM

## 2024-03-22 DIAGNOSIS — E66.9 TYPE 2 DIABETES MELLITUS WITH OBESITY: ICD-10-CM

## 2024-03-22 DIAGNOSIS — Z11.59 NEED FOR HEPATITIS C SCREENING TEST: ICD-10-CM

## 2024-03-22 DIAGNOSIS — Z12.5 SCREENING FOR PROSTATE CANCER: ICD-10-CM

## 2024-03-22 LAB
ALBUMIN SERPL BCP-MCNC: 4.1 G/DL (ref 3.5–5.2)
ALP SERPL-CCNC: 53 U/L (ref 55–135)
ALT SERPL W/O P-5'-P-CCNC: 22 U/L (ref 10–44)
ANION GAP SERPL CALC-SCNC: 9 MMOL/L (ref 8–16)
AST SERPL-CCNC: 22 U/L (ref 10–40)
BASOPHILS # BLD AUTO: 0.05 K/UL (ref 0–0.2)
BASOPHILS NFR BLD: 0.9 % (ref 0–1.9)
BILIRUB SERPL-MCNC: 0.5 MG/DL (ref 0.1–1)
BUN SERPL-MCNC: 18 MG/DL (ref 6–20)
CALCIUM SERPL-MCNC: 10.6 MG/DL (ref 8.7–10.5)
CHLORIDE SERPL-SCNC: 103 MMOL/L (ref 95–110)
CHOLEST SERPL-MCNC: 142 MG/DL (ref 120–199)
CHOLEST/HDLC SERPL: 3 {RATIO} (ref 2–5)
CO2 SERPL-SCNC: 30 MMOL/L (ref 23–29)
COMPLEXED PSA SERPL-MCNC: 0.62 NG/ML (ref 0–4)
CREAT SERPL-MCNC: 1.3 MG/DL (ref 0.5–1.4)
DIFFERENTIAL METHOD BLD: ABNORMAL
EOSINOPHIL # BLD AUTO: 0.3 K/UL (ref 0–0.5)
EOSINOPHIL NFR BLD: 5.5 % (ref 0–8)
ERYTHROCYTE [DISTWIDTH] IN BLOOD BY AUTOMATED COUNT: 13.4 % (ref 11.5–14.5)
EST. GFR  (NO RACE VARIABLE): >60 ML/MIN/1.73 M^2
ESTIMATED AVG GLUCOSE: 126 MG/DL (ref 68–131)
GLUCOSE SERPL-MCNC: 96 MG/DL (ref 70–110)
HBA1C MFR BLD: 6 % (ref 4–5.6)
HCT VFR BLD AUTO: 47.4 % (ref 40–54)
HCV AB SERPL QL IA: NORMAL
HDLC SERPL-MCNC: 48 MG/DL (ref 40–75)
HDLC SERPL: 33.8 % (ref 20–50)
HGB BLD-MCNC: 14.8 G/DL (ref 14–18)
HIV 1+2 AB+HIV1 P24 AG SERPL QL IA: NORMAL
IMM GRANULOCYTES # BLD AUTO: 0.01 K/UL (ref 0–0.04)
IMM GRANULOCYTES NFR BLD AUTO: 0.2 % (ref 0–0.5)
LDLC SERPL CALC-MCNC: 77.6 MG/DL (ref 63–159)
LYMPHOCYTES # BLD AUTO: 2.5 K/UL (ref 1–4.8)
LYMPHOCYTES NFR BLD: 45.3 % (ref 18–48)
MCH RBC QN AUTO: 30.6 PG (ref 27–31)
MCHC RBC AUTO-ENTMCNC: 31.2 G/DL (ref 32–36)
MCV RBC AUTO: 98 FL (ref 82–98)
MONOCYTES # BLD AUTO: 0.5 K/UL (ref 0.3–1)
MONOCYTES NFR BLD: 8.7 % (ref 4–15)
NEUTROPHILS # BLD AUTO: 2.1 K/UL (ref 1.8–7.7)
NEUTROPHILS NFR BLD: 39.4 % (ref 38–73)
NONHDLC SERPL-MCNC: 94 MG/DL
NRBC BLD-RTO: 0 /100 WBC
PLATELET # BLD AUTO: 201 K/UL (ref 150–450)
PMV BLD AUTO: 12 FL (ref 9.2–12.9)
POTASSIUM SERPL-SCNC: 4.4 MMOL/L (ref 3.5–5.1)
PROT SERPL-MCNC: 7.3 G/DL (ref 6–8.4)
RBC # BLD AUTO: 4.83 M/UL (ref 4.6–6.2)
SODIUM SERPL-SCNC: 142 MMOL/L (ref 136–145)
TRIGL SERPL-MCNC: 82 MG/DL (ref 30–150)
WBC # BLD AUTO: 5.41 K/UL (ref 3.9–12.7)

## 2024-03-22 PROCEDURE — 86803 HEPATITIS C AB TEST: CPT | Mod: HCNC | Performed by: FAMILY MEDICINE

## 2024-03-22 PROCEDURE — 84153 ASSAY OF PSA TOTAL: CPT | Mod: HCNC | Performed by: FAMILY MEDICINE

## 2024-03-22 PROCEDURE — 80061 LIPID PANEL: CPT | Mod: HCNC | Performed by: FAMILY MEDICINE

## 2024-03-22 PROCEDURE — 36415 COLL VENOUS BLD VENIPUNCTURE: CPT | Mod: HCNC,PN | Performed by: FAMILY MEDICINE

## 2024-03-22 PROCEDURE — 83036 HEMOGLOBIN GLYCOSYLATED A1C: CPT | Mod: HCNC | Performed by: FAMILY MEDICINE

## 2024-03-22 PROCEDURE — 80053 COMPREHEN METABOLIC PANEL: CPT | Mod: HCNC | Performed by: FAMILY MEDICINE

## 2024-03-22 PROCEDURE — 87389 HIV-1 AG W/HIV-1&-2 AB AG IA: CPT | Mod: HCNC | Performed by: FAMILY MEDICINE

## 2024-03-22 PROCEDURE — 85025 COMPLETE CBC W/AUTO DIFF WBC: CPT | Mod: HCNC | Performed by: FAMILY MEDICINE

## 2024-03-27 ENCOUNTER — OFFICE VISIT (OUTPATIENT)
Dept: FAMILY MEDICINE | Facility: CLINIC | Age: 59
End: 2024-03-27
Payer: MEDICARE

## 2024-03-27 VITALS
DIASTOLIC BLOOD PRESSURE: 60 MMHG | WEIGHT: 232.13 LBS | HEART RATE: 73 BPM | OXYGEN SATURATION: 99 % | HEIGHT: 69 IN | TEMPERATURE: 98 F | BODY MASS INDEX: 34.38 KG/M2 | SYSTOLIC BLOOD PRESSURE: 128 MMHG

## 2024-03-27 DIAGNOSIS — E66.9 TYPE 2 DIABETES MELLITUS WITH OBESITY: Primary | Chronic | ICD-10-CM

## 2024-03-27 DIAGNOSIS — E83.52 HYPERCALCEMIA: ICD-10-CM

## 2024-03-27 DIAGNOSIS — J34.2 NASAL SEPTAL DEVIATION: ICD-10-CM

## 2024-03-27 DIAGNOSIS — F51.04 CHRONIC INSOMNIA: Chronic | ICD-10-CM

## 2024-03-27 DIAGNOSIS — H40.1130 PRIMARY OPEN ANGLE GLAUCOMA OF BOTH EYES, UNSPECIFIED GLAUCOMA STAGE: Chronic | ICD-10-CM

## 2024-03-27 DIAGNOSIS — F69 BEHAVIOR CONCERN IN ADULT: ICD-10-CM

## 2024-03-27 DIAGNOSIS — J31.0 CHRONIC RHINITIS: Chronic | ICD-10-CM

## 2024-03-27 DIAGNOSIS — H54.3 BLIND IN BOTH EYES: ICD-10-CM

## 2024-03-27 DIAGNOSIS — E78.5 DYSLIPIDEMIA: Chronic | ICD-10-CM

## 2024-03-27 DIAGNOSIS — K21.9 GASTROESOPHAGEAL REFLUX DISEASE WITHOUT ESOPHAGITIS: Chronic | ICD-10-CM

## 2024-03-27 DIAGNOSIS — H60.391 ACUTE INFECTIVE OTITIS EXTERNA, RIGHT: ICD-10-CM

## 2024-03-27 DIAGNOSIS — R35.0 URINARY FREQUENCY: ICD-10-CM

## 2024-03-27 DIAGNOSIS — E11.69 TYPE 2 DIABETES MELLITUS WITH OBESITY: Primary | Chronic | ICD-10-CM

## 2024-03-27 PROCEDURE — 3066F NEPHROPATHY DOC TX: CPT | Mod: HCNC,CPTII,S$GLB, | Performed by: FAMILY MEDICINE

## 2024-03-27 PROCEDURE — 1160F RVW MEDS BY RX/DR IN RCRD: CPT | Mod: HCNC,CPTII,S$GLB, | Performed by: FAMILY MEDICINE

## 2024-03-27 PROCEDURE — 3044F HG A1C LEVEL LT 7.0%: CPT | Mod: HCNC,CPTII,S$GLB, | Performed by: FAMILY MEDICINE

## 2024-03-27 PROCEDURE — 3078F DIAST BP <80 MM HG: CPT | Mod: HCNC,CPTII,S$GLB, | Performed by: FAMILY MEDICINE

## 2024-03-27 PROCEDURE — 1159F MED LIST DOCD IN RCRD: CPT | Mod: HCNC,CPTII,S$GLB, | Performed by: FAMILY MEDICINE

## 2024-03-27 PROCEDURE — 3061F NEG MICROALBUMINURIA REV: CPT | Mod: HCNC,CPTII,S$GLB, | Performed by: FAMILY MEDICINE

## 2024-03-27 PROCEDURE — 99999 PR PBB SHADOW E&M-EST. PATIENT-LVL IV: CPT | Mod: PBBFAC,HCNC,, | Performed by: FAMILY MEDICINE

## 2024-03-27 PROCEDURE — 99214 OFFICE O/P EST MOD 30 MIN: CPT | Mod: HCNC,S$GLB,, | Performed by: FAMILY MEDICINE

## 2024-03-27 PROCEDURE — 3008F BODY MASS INDEX DOCD: CPT | Mod: HCNC,CPTII,S$GLB, | Performed by: FAMILY MEDICINE

## 2024-03-27 PROCEDURE — 3074F SYST BP LT 130 MM HG: CPT | Mod: HCNC,CPTII,S$GLB, | Performed by: FAMILY MEDICINE

## 2024-03-27 RX ORDER — TRAZODONE HYDROCHLORIDE 50 MG/1
50 TABLET ORAL NIGHTLY
Qty: 90 TABLET | Refills: 2 | Status: SHIPPED | OUTPATIENT
Start: 2024-03-27 | End: 2024-05-22

## 2024-03-27 RX ORDER — METFORMIN HYDROCHLORIDE 500 MG/1
500 TABLET ORAL 2 TIMES DAILY WITH MEALS
Qty: 180 TABLET | Refills: 2 | Status: SHIPPED | OUTPATIENT
Start: 2024-03-27 | End: 2024-06-06 | Stop reason: SDUPTHER

## 2024-03-27 RX ORDER — LORATADINE 10 MG/1
10 TABLET ORAL DAILY
Qty: 90 TABLET | Refills: 3 | Status: SHIPPED | OUTPATIENT
Start: 2024-03-27 | End: 2024-06-06 | Stop reason: SDUPTHER

## 2024-03-27 RX ORDER — OMEPRAZOLE 20 MG/1
20 CAPSULE, DELAYED RELEASE ORAL DAILY
Qty: 30 CAPSULE | Refills: 3 | Status: SHIPPED | OUTPATIENT
Start: 2024-03-27 | End: 2024-06-06 | Stop reason: SDUPTHER

## 2024-03-27 RX ORDER — FLUTICASONE PROPIONATE 50 MCG
2 SPRAY, SUSPENSION (ML) NASAL DAILY
Qty: 16 ML | Refills: 11 | Status: SHIPPED | OUTPATIENT
Start: 2024-03-27

## 2024-03-27 RX ORDER — TAMSULOSIN HYDROCHLORIDE 0.4 MG/1
0.4 CAPSULE ORAL DAILY
Qty: 90 CAPSULE | Refills: 3 | Status: SHIPPED | OUTPATIENT
Start: 2024-03-27 | End: 2024-06-06 | Stop reason: SDUPTHER

## 2024-03-27 RX ORDER — ATORVASTATIN CALCIUM 20 MG/1
20 TABLET, FILM COATED ORAL DAILY
Qty: 90 TABLET | Refills: 2 | Status: SHIPPED | OUTPATIENT
Start: 2024-03-27 | End: 2024-06-06 | Stop reason: SDUPTHER

## 2024-03-27 RX ORDER — OFLOXACIN 3 MG/ML
5 SOLUTION AURICULAR (OTIC) DAILY
Qty: 10 ML | Refills: 0 | Status: SHIPPED | OUTPATIENT
Start: 2024-03-27 | End: 2024-04-03

## 2024-03-28 ENCOUNTER — LAB VISIT (OUTPATIENT)
Dept: LAB | Facility: HOSPITAL | Age: 59
End: 2024-03-28
Attending: FAMILY MEDICINE
Payer: MEDICARE

## 2024-03-28 DIAGNOSIS — R35.0 URINARY FREQUENCY: ICD-10-CM

## 2024-03-28 LAB
BILIRUB UR QL STRIP: NEGATIVE
CLARITY UR REFRACT.AUTO: CLEAR
COLOR UR AUTO: YELLOW
GLUCOSE UR QL STRIP: NEGATIVE
HGB UR QL STRIP: ABNORMAL
KETONES UR QL STRIP: NEGATIVE
LEUKOCYTE ESTERASE UR QL STRIP: NEGATIVE
NITRITE UR QL STRIP: NEGATIVE
PH UR STRIP: 6 [PH] (ref 5–8)
PROT UR QL STRIP: ABNORMAL
SP GR UR STRIP: >1.03 (ref 1–1.03)
URN SPEC COLLECT METH UR: ABNORMAL

## 2024-03-28 PROCEDURE — 81003 URINALYSIS AUTO W/O SCOPE: CPT | Mod: HCNC | Performed by: FAMILY MEDICINE

## 2024-03-28 PROCEDURE — 87086 URINE CULTURE/COLONY COUNT: CPT | Mod: HCNC | Performed by: FAMILY MEDICINE

## 2024-03-29 LAB
BACTERIA UR CULT: NORMAL
BACTERIA UR CULT: NORMAL

## 2024-03-31 PROBLEM — E78.5 DYSLIPIDEMIA: Chronic | Status: ACTIVE | Noted: 2024-03-31

## 2024-03-31 PROBLEM — F51.04 CHRONIC INSOMNIA: Chronic | Status: ACTIVE | Noted: 2024-03-31

## 2024-03-31 PROBLEM — E66.9 TYPE 2 DIABETES MELLITUS WITH OBESITY: Chronic | Status: ACTIVE | Noted: 2024-03-31

## 2024-03-31 PROBLEM — E11.69 TYPE 2 DIABETES MELLITUS WITH OBESITY: Chronic | Status: ACTIVE | Noted: 2024-03-31

## 2024-03-31 PROBLEM — H54.3 BLIND IN BOTH EYES: Status: ACTIVE | Noted: 2024-03-31

## 2024-03-31 PROBLEM — K21.9 GASTROESOPHAGEAL REFLUX DISEASE WITHOUT ESOPHAGITIS: Chronic | Status: ACTIVE | Noted: 2024-03-31

## 2024-03-31 PROBLEM — J31.0 CHRONIC RHINITIS: Chronic | Status: ACTIVE | Noted: 2024-03-31

## 2024-03-31 PROBLEM — H40.1130 PRIMARY OPEN ANGLE GLAUCOMA (POAG) OF BOTH EYES: Chronic | Status: ACTIVE | Noted: 2024-03-31

## 2024-04-01 NOTE — PROGRESS NOTES
"  Patient Name: Dallas Lawson Jr.    : 1965  MRN: [id number]      Subjective:     Patient ID: Dallas is a 58 y.o. male    Chief Complaint:  Diabetes (Mom expresses wanting to get some home health )    58-year-old male with chronic conditions as per problem list comes in with his mother for routine follow-up on these.  He reports needing refills on his medications.  Of note, mother reports that he has been having increased urinary frequency including nocturnally.  She also reports having concerns with his behavior and reports that she would feel she needs help such as someone to come to the house as she needs a break with his care taking as she is the only caretaker  He reports having right-sided ear pain for the last several days.         Review of Systems   Constitutional:  Negative for unexpected weight change.   HENT:  Positive for ear pain. Negative for sore throat.    Eyes:  Positive for visual disturbance.   Respiratory:  Negative for shortness of breath.    Cardiovascular:  Negative for chest pain.   Gastrointestinal:  Negative for abdominal pain and blood in stool.   Endocrine: Negative for cold intolerance and heat intolerance.   Genitourinary:  Negative for dysuria and frequency.   Musculoskeletal:  Positive for back pain, gait problem, leg pain and myalgias.   Integumentary:  Negative for rash.   Neurological:  Negative for weakness, numbness and headaches.   Hematological:  Negative for adenopathy.   Psychiatric/Behavioral:  Negative for suicidal ideas.         Objective:   /60 (BP Location: Right arm, Patient Position: Sitting, BP Method: Large (Manual))   Pulse 73   Temp 98.1 °F (36.7 °C) (Oral)   Ht 5' 8.5" (1.74 m)   Wt 105.3 kg (232 lb 2.3 oz)   SpO2 99%   BMI 34.78 kg/m²     Physical Exam  Vitals reviewed.   Constitutional:       General: He is not in acute distress.  HENT:      Head: Normocephalic and atraumatic.      Right Ear: Drainage present.      Left Ear: Ear canal and " external ear normal.      Nose: Nose normal.      Mouth/Throat:      Mouth: Mucous membranes are moist.      Pharynx: No oropharyngeal exudate or posterior oropharyngeal erythema.   Eyes:      Extraocular Movements: Extraocular movements intact.      Conjunctiva/sclera: Conjunctivae normal.      Pupils: Pupils are equal, round, and reactive to light.   Cardiovascular:      Rate and Rhythm: Normal rate and regular rhythm.      Pulses: Normal pulses.      Heart sounds: Normal heart sounds.   Pulmonary:      Effort: Pulmonary effort is normal. No respiratory distress.      Breath sounds: No wheezing or rales.   Abdominal:      General: Abdomen is flat. Bowel sounds are normal. There is no distension.      Palpations: Abdomen is soft.      Tenderness: There is no abdominal tenderness. There is no guarding.   Musculoskeletal:      Cervical back: Normal range of motion. No rigidity or tenderness.   Lymphadenopathy:      Cervical: No cervical adenopathy.   Skin:     General: Skin is warm.      Capillary Refill: Capillary refill takes less than 2 seconds.   Neurological:      Mental Status: He is alert and oriented to person, place, and time.      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      Sensory: No sensory deficit.   Psychiatric:         Mood and Affect: Mood normal.         Behavior: Behavior normal.        Lab Visit on 03/22/2024   Component Date Value Ref Range Status    Microalbumin, Urine 03/22/2024 13.0  ug/mL Final    Creatinine, Urine 03/22/2024 379.0 (H)  23.0 - 375.0 mg/dL Final    Microalb/Creat Ratio 03/22/2024 3.4  0.0 - 30.0 ug/mg Final   Lab Visit on 03/22/2024   Component Date Value Ref Range Status    WBC 03/22/2024 5.41  3.90 - 12.70 K/uL Final    RBC 03/22/2024 4.83  4.60 - 6.20 M/uL Final    Hemoglobin 03/22/2024 14.8  14.0 - 18.0 g/dL Final    Hematocrit 03/22/2024 47.4  40.0 - 54.0 % Final    MCV 03/22/2024 98  82 - 98 fL Final    MCH 03/22/2024 30.6  27.0 - 31.0 pg  Final    MCHC 03/22/2024 31.2 (L)  32.0 - 36.0 g/dL Final    RDW 03/22/2024 13.4  11.5 - 14.5 % Final    Platelets 03/22/2024 201  150 - 450 K/uL Final    MPV 03/22/2024 12.0  9.2 - 12.9 fL Final    Immature Granulocytes 03/22/2024 0.2  0.0 - 0.5 % Final    Gran # (ANC) 03/22/2024 2.1  1.8 - 7.7 K/uL Final    Immature Grans (Abs) 03/22/2024 0.01  0.00 - 0.04 K/uL Final    Comment: Mild elevation in immature granulocytes is non specific and   can be seen in a variety of conditions including stress response,   acute inflammation, trauma and pregnancy. Correlation with other   laboratory and clinical findings is essential.      Lymph # 03/22/2024 2.5  1.0 - 4.8 K/uL Final    Mono # 03/22/2024 0.5  0.3 - 1.0 K/uL Final    Eos # 03/22/2024 0.3  0.0 - 0.5 K/uL Final    Baso # 03/22/2024 0.05  0.00 - 0.20 K/uL Final    nRBC 03/22/2024 0  0 /100 WBC Final    Gran % 03/22/2024 39.4  38.0 - 73.0 % Final    Lymph % 03/22/2024 45.3  18.0 - 48.0 % Final    Mono % 03/22/2024 8.7  4.0 - 15.0 % Final    Eosinophil % 03/22/2024 5.5  0.0 - 8.0 % Final    Basophil % 03/22/2024 0.9  0.0 - 1.9 % Final    Differential Method 03/22/2024 Automated   Final    Sodium 03/22/2024 142  136 - 145 mmol/L Final    Potassium 03/22/2024 4.4  3.5 - 5.1 mmol/L Final    Chloride 03/22/2024 103  95 - 110 mmol/L Final    CO2 03/22/2024 30 (H)  23 - 29 mmol/L Final    Glucose 03/22/2024 96  70 - 110 mg/dL Final    BUN 03/22/2024 18  6 - 20 mg/dL Final    Creatinine 03/22/2024 1.3  0.5 - 1.4 mg/dL Final    Calcium 03/22/2024 10.6 (H)  8.7 - 10.5 mg/dL Final    Total Protein 03/22/2024 7.3  6.0 - 8.4 g/dL Final    Albumin 03/22/2024 4.1  3.5 - 5.2 g/dL Final    Total Bilirubin 03/22/2024 0.5  0.1 - 1.0 mg/dL Final    Comment: For infants and newborns, interpretation of results should be based  on gestational age, weight and in agreement with clinical  observations.    Premature Infant recommended reference ranges:  Up to 24 hours.............<8.0 mg/dL  Up  to 48 hours............<12.0 mg/dL  3-5 days..................<15.0 mg/dL  6-29 days.................<15.0 mg/dL      Alkaline Phosphatase 03/22/2024 53 (L)  55 - 135 U/L Final    AST 03/22/2024 22  10 - 40 U/L Final    ALT 03/22/2024 22  10 - 44 U/L Final    eGFR 03/22/2024 >60.0  >60 mL/min/1.73 m^2 Final    Anion Gap 03/22/2024 9  8 - 16 mmol/L Final    Hemoglobin A1C 03/22/2024 6.0 (H)  4.0 - 5.6 % Final    Comment: ADA Screening Guidelines:  5.7-6.4%  Consistent with prediabetes  >or=6.5%  Consistent with diabetes    High levels of fetal hemoglobin interfere with the HbA1C  assay. Heterozygous hemoglobin variants (HbS, HgC, etc)do  not significantly interfere with this assay.   However, presence of multiple variants may affect accuracy.      Estimated Avg Glucose 03/22/2024 126  68 - 131 mg/dL Final    Cholesterol 03/22/2024 142  120 - 199 mg/dL Final    Comment: The National Cholesterol Education Program (NCEP) has set the  following guidelines (reference ranges) for Cholesterol:  Optimal.....................<200 mg/dL  Borderline High.............200-239 mg/dL  High........................> or = 240 mg/dL      Triglycerides 03/22/2024 82  30 - 150 mg/dL Final    Comment: The National Cholesterol Education Program (NCEP) has set the  following guidelines (reference values) for triglycerides:  Normal......................<150 mg/dL  Borderline High.............150-199 mg/dL  High........................200-499 mg/dL      HDL 03/22/2024 48  40 - 75 mg/dL Final    Comment: The National Cholesterol Education Program (NCEP) has set the  following guidelines (reference values) for HDL Cholesterol:  Low...............<40 mg/dL  Optimal...........>60 mg/dL      LDL Cholesterol 03/22/2024 77.6  63.0 - 159.0 mg/dL Final    Comment: The National Cholesterol Education Program (NCEP) has set the  following guidelines (reference values) for LDL Cholesterol:  Optimal.......................<130 mg/dL  Borderline  High...............130-159 mg/dL  High..........................160-189 mg/dL  Very High.....................>190 mg/dL      HDL/Cholesterol Ratio 03/22/2024 33.8  20.0 - 50.0 % Final    Total Cholesterol/HDL Ratio 03/22/2024 3.0  2.0 - 5.0 Final    Non-HDL Cholesterol 03/22/2024 94  mg/dL Final    Comment: Risk category and Non-HDL cholesterol goals:  Coronary heart disease (CHD)or equivalent (10-year risk of CHD >20%):  Non-HDL cholesterol goal     <130 mg/dL  Two or more CHD risk factors and 10-year risk of CHD <= 20%:  Non-HDL cholesterol goal     <160 mg/dL  0 to 1 CHD risk factor:  Non-HDL cholesterol goal     <190 mg/dL      PSA, Screen 03/22/2024 0.62  0.00 - 4.00 ng/mL Final    Comment: The testing method is a chemiluminescent microparticle immunoassay   manufactured by [employer] and performed on the [employer]   or   [employer] system. Values obtained with different assay manufacturers   for   methods may be different and cannot be used interchangeably.  PSA Expected levels:  Hormonal Therapy: <0.05 ng/ml  Prostatectomy: <0.01 ng/ml  Radiation Therapy: <1.00 ng/ml      HIV 1/2 Ag/Ab 03/22/2024 Non-reactive  Non-reactive Final    Hepatitis C Ab 03/22/2024 Non-reactive  Non-reactive Final       Assessment        ICD-10-CM ICD-9-CM   1. Type 2 diabetes mellitus with obesity  E11.69 250.00    E66.9 278.00   2. Dyslipidemia  E78.5 272.4   3. Gastroesophageal reflux disease without esophagitis  K21.9 530.81   4. Chronic rhinitis  J31.0 472.0   5. Nasal septal deviation  J34.2 470   6. Primary open angle glaucoma of both eyes, unspecified glaucoma stage  H40.1130 365.11     365.70   7. Urinary frequency  R35.0 788.41   8. Hypercalcemia  E83.52 275.42   9. Behavior concern in adult  F69 V40.9   10. Chronic insomnia  F51.04 780.52   11. Acute infective otitis externa, right  H60.391 380.10         Plan:     1. Type 2 diabetes mellitus with obesity  -     metFORMIN (GLUCOPHAGE) 500 MG tablet; Take 1 tablet  (500 mg total) by mouth 2 (two) times daily with meals.  Dispense: 180 tablet; Refill: 2  -     Comprehensive metabolic panel; Future; Expected date: 09/27/2024  -     Hemoglobin A1c; Future; Expected date: 09/27/2024  -     Lipid panel; Future; Expected date: 09/27/2024  Continue current dose of metformin.  Discussed importance of routine follow-up.    2. Dyslipidemia  -     atorvastatin (LIPITOR) 20 MG tablet; Take 1 tablet (20 mg total) by mouth once daily.  Dispense: 90 tablet; Refill: 2  -     Comprehensive metabolic panel; Future; Expected date: 09/27/2024  -     Lipid panel; Future; Expected date: 09/27/2024  Continue atorvastatin 20 milligrams daily.      3. Gastroesophageal reflux disease without esophagitis  -     omeprazole (PRILOSEC) 20 MG capsule; Take 1 capsule (20 mg total) by mouth once daily.  Dispense: 30 capsule; Refill: 3  Continue omeprazole.    4. Chronic rhinitis/  5. Nasal septal deviation  -     loratadine (CLARITIN) 10 mg tablet; Take 1 tablet (10 mg total) by mouth once daily.  Dispense: 90 tablet; Refill: 3  -     fluticasone propionate (FLONASE) 50 mcg/actuation nasal spray; 2 sprays (100 mcg total) by Each Nostril route once daily.  Dispense: 16 mL; Refill: 11  Continue current regimen.    6. Primary open angle glaucoma of both eyes, unspecified glaucoma stage  -     Ambulatory referral/consult to Optometry; Future; Expected date: 04/03/2024  Referral to Optometry placed.    7. Urinary frequency  -     tamsulosin (FLOMAX) 0.4 mg Cap; Take 1 capsule (0.4 mg total) by mouth once daily.  Dispense: 90 capsule; Refill: 3  -     Urinalysis; Future; Expected date: 03/27/2024  -     Urine culture; Future; Expected date: 03/27/2024  Check urine culture.    Will start on tamsulosin.    8. Hypercalcemia  -     CALCIUM; Future; Expected date: 03/27/2024  Recheck calcium.    9. Behavior concern in adult  -     Ambulatory referral/consult to Outpatient Case Management  Referral to case management  placed to see what options the patient may have available.    10. Chronic insomnia  -     traZODone (DESYREL) 50 MG tablet; Take 1 tablet (50 mg total) by mouth every evening.  Dispense: 90 tablet; Refill: 2    11. Acute infective otitis externa, right  -     ofloxacin (FLOXIN) 0.3 % otic solution; Place 5 drops into the right ear once daily. for 7 days  Dispense: 10 mL; Refill: 0  Prescribed course of ofloxacin otic.    Return to clinic if not resolved with a week.         -Ananda Kwong Jr., MD, AAHIVS      This office note has been dictated.  This dictation has been generated using M-Modal Fluency Direct dictation; some phonetic errors may occur.         There are no Patient Instructions on file for this visit.      Follow up in about 6 months (around 9/27/2024).   Future Appointments   Date Time Provider Department Center   6/6/2024  2:40 PM Ananda Kwong Jr., MD [facility]   9/24/2024  8:30 AM [facility]   10/1/2024  2:40 PM Ananda Kwong Jr., MD [facility]

## 2024-04-17 ENCOUNTER — OUTPATIENT CASE MANAGEMENT (OUTPATIENT)
Dept: ADMINISTRATIVE | Facility: OTHER | Age: 59
End: 2024-04-17
Payer: MEDICARE

## 2024-04-19 NOTE — PROGRESS NOTES
[facility] received a referral on the above patient to assist with caregiver support. SW contacted patient mother. Ms. Munoz regarding referral. Ms. Munoz reports patient is independent with ADL's but unable to maneuver on his own due to his sight. Mom is seeking resources to assist with more daytime help. SW discussed resources through [facility]. Mom advised [facility]  came out to the home and completed assessment but there's no funding available for programs offered by agency. SW discussed possible vocational rehabilitation. Mom advised [facility] patient would not be appropriate for program. Mom advised [facility] patient will do what he wants to do. Mother reports since patient has loss vision in both eyes, he's unable to catch the [employer] bus to [place] to hang out. When patient had partial sight, 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 Mom advised [facility] employees with the local businesses would have patient complete certain task and would provide a few dollars for completion. Patient doesn't meet criteria for COA based on age.  Mom is seeking someone who can assist with daily needs and socialization. DUNIA explored resources through Vocational Rehab and Medicare Ticket to work program. Mom denied these options. Mother seeking a PCA/  to assist with his needs. SW discussed CCW program. Mother an agreement with contacting [employer] in [place] and apply for assistance.

## 2024-04-26 ENCOUNTER — PATIENT OUTREACH (OUTPATIENT)
Dept: ADMINISTRATIVE | Facility: HOSPITAL | Age: 59
End: 2024-04-26
Payer: MEDICARE

## 2024-04-26 NOTE — PROGRESS NOTES
Reached out to pt in regards to overdue colonoscopy unavailable left voicemail. Gap report updated.Immunization's updated/triggered.

## 2024-04-30 DIAGNOSIS — Z00.00 ENCOUNTER FOR MEDICARE ANNUAL WELLNESS EXAM: ICD-10-CM

## 2024-05-17 ENCOUNTER — TELEPHONE (OUTPATIENT)
Dept: FAMILY MEDICINE | Facility: CLINIC | Age: 59
End: 2024-05-17
Payer: MEDICARE

## 2024-05-17 NOTE — TELEPHONE ENCOUNTER
----- Message from Deirdre Julien sent at 5/17/2024  3:43 PM CDT -----  Regarding: Abi [phone]  Type: Patient Call Back     Who called: Self     What is the request in detail: Pt is ready to schedule AWV     Can the clinic reply by [facility]? No     Would the patient rather a call back or a response via [facility]? Call     Best call back number: [phone]     Additional Information: Pt is asking to be seen on Tuesday

## 2024-05-22 ENCOUNTER — OFFICE VISIT (OUTPATIENT)
Dept: PRIMARY CARE CLINIC | Facility: CLINIC | Age: 59
End: 2024-05-22
Payer: MEDICARE

## 2024-05-22 VITALS
HEART RATE: 67 BPM | BODY MASS INDEX: 33.67 KG/M2 | WEIGHT: 227.31 LBS | OXYGEN SATURATION: 95 % | SYSTOLIC BLOOD PRESSURE: 110 MMHG | RESPIRATION RATE: 18 BRPM | HEIGHT: 69 IN | DIASTOLIC BLOOD PRESSURE: 78 MMHG

## 2024-05-22 DIAGNOSIS — E66.9 TYPE 2 DIABETES MELLITUS WITH OBESITY: Chronic | ICD-10-CM

## 2024-05-22 DIAGNOSIS — Z00.00 ENCOUNTER FOR MEDICARE ANNUAL WELLNESS EXAM: Primary | ICD-10-CM

## 2024-05-22 DIAGNOSIS — E11.69 TYPE 2 DIABETES MELLITUS WITH OBESITY: Chronic | ICD-10-CM

## 2024-05-22 DIAGNOSIS — E78.5 DYSLIPIDEMIA: Chronic | ICD-10-CM

## 2024-05-22 DIAGNOSIS — H54.3 BLIND IN BOTH EYES: ICD-10-CM

## 2024-05-22 PROCEDURE — 3066F NEPHROPATHY DOC TX: CPT | Mod: CPTII,S$GLB,,

## 2024-05-22 PROCEDURE — 1159F MED LIST DOCD IN RCRD: CPT | Mod: CPTII,S$GLB,,

## 2024-05-22 PROCEDURE — 3044F HG A1C LEVEL LT 7.0%: CPT | Mod: CPTII,S$GLB,,

## 2024-05-22 PROCEDURE — 3061F NEG MICROALBUMINURIA REV: CPT | Mod: CPTII,S$GLB,,

## 2024-05-22 PROCEDURE — 3074F SYST BP LT 130 MM HG: CPT | Mod: CPTII,S$GLB,,

## 2024-05-22 PROCEDURE — G0439 PPPS, SUBSEQ VISIT: HCPCS | Mod: S$GLB,,,

## 2024-05-22 PROCEDURE — G9919 SCRN ND POS ND PROV OF REC: HCPCS | Mod: CPTII,S$GLB,,

## 2024-05-22 PROCEDURE — 1160F RVW MEDS BY RX/DR IN RCRD: CPT | Mod: CPTII,S$GLB,,

## 2024-05-22 PROCEDURE — 99999 PR PBB SHADOW E&M-EST. PATIENT-LVL V: CPT | Mod: PBBFAC,,,

## 2024-05-22 PROCEDURE — 3078F DIAST BP <80 MM HG: CPT | Mod: CPTII,S$GLB,,

## 2024-05-22 RX ORDER — DORZOLAMIDE HYDROCHLORIDE AND TIMOLOL MALEATE 20; 5 MG/ML; MG/ML
SOLUTION/ DROPS OPHTHALMIC
COMMUNITY
Start: 2024-03-09

## 2024-05-22 RX ORDER — MULTIVITAMIN
1 TABLET ORAL DAILY
COMMUNITY

## 2024-05-22 NOTE — PROGRESS NOTES
"  Dallas Lawson presented for a  Medicare AWV and comprehensive Health Risk Assessment today. He is a patient of Dr. Kwong and is new to me. He came into examination room alone. His mother arrived to visit with him, but she staying in waiting room during visit. The following components were reviewed and updated:    Medical history  Family History  Social history  Allergies and Current Medications  Health Risk Assessment  Health Maintenance  Care Team     Patient screened moderate and/or high risk for one or more social determinants of health (SDOH). Patient connected to community resources through the ED Navigator.    ** See Completed Assessments for Annual Wellness Visit within the encounter summary.**    The following assessments were completed:  Living Situation  CAGE  Depression Screening  Timed Get Up and Go  Whisper Test  Cognitive Function Screening-unable to properly perform screening due to pt's inability to see.  Nutrition Screening  ADL Screening  PAQ Screening  Review for opioid screen: pt does not have rx for opioid  Review for substance use disorder:  pt does not use substance        Vitals:    05/22/24 1308   BP: 110/78   BP Location: Right arm   Patient Position: Sitting   Pulse: 67   Resp: 18   SpO2: 95%   Weight: 103.1 kg (227 lb 4.7 oz)   Height: 5' 8.5" (1.74 m)     Body mass index is 34.06 kg/m².  Physical Exam  Vitals reviewed.   Constitutional:       Appearance: Normal appearance.   HENT:      Head: Normocephalic and atraumatic.   Cardiovascular:      Rate and Rhythm: Normal rate and regular rhythm.      Pulses: Normal pulses.           Radial pulses are 2+ on the right side and 2+ on the left side.        Dorsalis pedis pulses are 2+ on the right side and 2+ on the left side.        Posterior tibial pulses are 2+ on the right side and 2+ on the left side.      Heart sounds: Normal heart sounds.   Pulmonary:      Effort: Pulmonary effort is normal.      Breath sounds: Normal breath sounds. "   Musculoskeletal:      Right lower leg: No edema.      Left lower leg: No edema.   Skin:     General: Skin is warm and dry.      Capillary Refill: Capillary refill takes less than 2 seconds.   Neurological:      General: No focal deficit present.      Mental Status: He is alert and oriented to person, place, and time.   Psychiatric:         Mood and Affect: Mood normal.         Behavior: Behavior normal.        Diagnoses and health risks identified today and associated recommendations/orders:    1. Encounter for Medicare annual wellness exam  Assessment and evaluation performed as stated above  -     Ambulatory Referral/Consult to [facility] Annual Wellness Visit (eAWV)    2. Blind in both eyes  Pt stated he was dx in September 2023.  After speaking to pt's mother, she stated that has has always had eye sight problems in one eye.  She expected him to go blind in one eye, but he is now blind in both eyes.    Pt asked about a cell phone specifically for blind people. Pt has a cell phone, but he stated that it is a normal phone, not one specifically for the blind.  Pt's mother also asked about possible reprieve services.  Referral initiated.  -     Ambulatory referral/consult to Outpatient Case Management    3. Dyslipidemia  Stable on atorvastatin.  Followed by pcp.    4. Type 2 diabetes mellitus with obesity  Stable on metformin.  Encouraged pt to increase activity as much as possible.  Followed by pcp.      Provided Dallas with a 5-10 year written screening schedule and personal prevention plan. Recommendations were developed using the USPSTF age appropriate recommendations. Education, counseling, and referrals were provided as needed. After Visit Summary printed and given to patient which includes a list of additional screenings\tests needed.    Follow up in about 1 year (around 5/22/2025), or if symptoms worsen or fail to improve.        Cat Gannon NP      Portions of this note may have been generated using  voice recognition software.  Please excuse any spelling/grammatical errors. Occasional wrong-word or sound-a-like substitutions may have also occurred due to the inherent limitations of voice recognition software. Please read the chart carefully and recognize, using context, where substitutions have occurred.    I offered to discuss advanced care planning, including how to pick a person who would make decisions for you if you were unable to make them for yourself, called a health care power of , and what kind of decisions you might make such as use of life sustaining treatments such as ventilators and tube feeding when faced with a life limiting illness recorded on a living will that they will need to know. (How you want to be cared for as you near the end of your natural life)     X  Patient is unable to engage in a discussion regarding advanced directives due to severe physical and/or cognitive impairment.

## 2024-05-22 NOTE — PATIENT INSTRUCTIONS
Counseling and Referral of Other Preventative  (Italic type indicates deductible and co-insurance are waived)    Patient Name: Dallas Lawson  Today's Date: 5/22/2024    Health Maintenance       Date Due Completion Date    Pneumococcal Vaccines (Age 0-64) (1 of 2 - PCV) Never done ---    TETANUS VACCINE Never done ---    Colorectal Cancer Screening Never done ---    Shingles Vaccine (1 of 2) Never done ---    COVID-19 Vaccine (5 - 2023-24 season) 09/01/2023 10/4/2022    Foot Exam 08/10/2024 8/10/2023    Influenza Vaccine (Season Ended) 09/01/2024 10/4/2022    Hemoglobin A1c 09/22/2024 3/22/2024    Eye Exam 09/28/2024 9/28/2023    PROSTATE-SPECIFIC ANTIGEN 03/22/2025 3/22/2024    Diabetes Urine Screening 03/22/2025 3/22/2024    Lipid Panel 03/22/2025 3/22/2024    Low Dose Statin 03/27/2025 3/27/2024        No orders of the defined types were placed in this encounter.      The following information is provided to all patients.  This information is to help you find resources for any of the problems found today that may be affecting your health:                  Living healthy guide: www.[facility].[place].gov      Understanding Diabetes: www.diabetes.org      Eating healthy: www.cdc.gov/healthyweight      [facility] home safety checklist: www.cdc.gov/steadi/patient.html      Agency on Aging: www.goea.[place].[facility]      Alcoholics anonymous (AA): www.aa.org      Physical Activity: www.peterson.nih.gov/zv1crku      Tobacco use: www.quitwithusla.org

## 2024-06-05 DIAGNOSIS — K21.9 GASTROESOPHAGEAL REFLUX DISEASE WITHOUT ESOPHAGITIS: Chronic | ICD-10-CM

## 2024-06-05 NOTE — TELEPHONE ENCOUNTER
Refill Routing Note   Medication(s) are not appropriate for processing by [facility] for the following reason(s):        New or recently adjusted medication    [facility] action(s):  Defer        Medication Therapy Plan: initiated by provider on 03/2024      Appointments  past 12m or future 3m with PCP    Date Provider   Last Visit   3/27/2024 Ananda Kwong Jr., MD   Next Visit   6/6/2024 Ananda Kwong Jr., MD   ED visits in past 90 days: 0        Note composed:7:27 AM 06/05/2024

## 2024-06-05 NOTE — TELEPHONE ENCOUNTER
No care due was identified.  [facility] Embedded Care Due Messages. Reference number: [id number].   6/05/2024 2:23:18 AM CDT

## 2024-06-06 ENCOUNTER — OFFICE VISIT (OUTPATIENT)
Dept: FAMILY MEDICINE | Facility: CLINIC | Age: 59
End: 2024-06-06
Payer: MEDICARE

## 2024-06-06 VITALS
HEIGHT: 68 IN | DIASTOLIC BLOOD PRESSURE: 62 MMHG | HEART RATE: 70 BPM | WEIGHT: 222 LBS | OXYGEN SATURATION: 97 % | RESPIRATION RATE: 18 BRPM | SYSTOLIC BLOOD PRESSURE: 122 MMHG | BODY MASS INDEX: 33.65 KG/M2

## 2024-06-06 DIAGNOSIS — F51.04 CHRONIC INSOMNIA: Chronic | ICD-10-CM

## 2024-06-06 DIAGNOSIS — K21.9 GASTROESOPHAGEAL REFLUX DISEASE WITHOUT ESOPHAGITIS: Chronic | ICD-10-CM

## 2024-06-06 DIAGNOSIS — E78.5 DYSLIPIDEMIA: Chronic | ICD-10-CM

## 2024-06-06 DIAGNOSIS — R35.0 URINARY FREQUENCY: ICD-10-CM

## 2024-06-06 DIAGNOSIS — Z12.12 SCREENING FOR COLORECTAL CANCER: ICD-10-CM

## 2024-06-06 DIAGNOSIS — E11.69 TYPE 2 DIABETES MELLITUS WITH OBESITY: Primary | Chronic | ICD-10-CM

## 2024-06-06 DIAGNOSIS — J31.0 CHRONIC RHINITIS: Chronic | ICD-10-CM

## 2024-06-06 DIAGNOSIS — Z12.11 SCREENING FOR COLORECTAL CANCER: ICD-10-CM

## 2024-06-06 DIAGNOSIS — E66.9 TYPE 2 DIABETES MELLITUS WITH OBESITY: Primary | Chronic | ICD-10-CM

## 2024-06-06 DIAGNOSIS — Z23 NEED FOR VACCINATION: ICD-10-CM

## 2024-06-06 PROCEDURE — 3008F BODY MASS INDEX DOCD: CPT | Mod: CPTII,S$GLB,, | Performed by: FAMILY MEDICINE

## 2024-06-06 PROCEDURE — 3078F DIAST BP <80 MM HG: CPT | Mod: CPTII,S$GLB,, | Performed by: FAMILY MEDICINE

## 2024-06-06 PROCEDURE — 3066F NEPHROPATHY DOC TX: CPT | Mod: CPTII,S$GLB,, | Performed by: FAMILY MEDICINE

## 2024-06-06 PROCEDURE — 90677 PCV20 VACCINE IM: CPT | Mod: S$GLB,,, | Performed by: FAMILY MEDICINE

## 2024-06-06 PROCEDURE — 3044F HG A1C LEVEL LT 7.0%: CPT | Mod: CPTII,S$GLB,, | Performed by: FAMILY MEDICINE

## 2024-06-06 PROCEDURE — 3074F SYST BP LT 130 MM HG: CPT | Mod: CPTII,S$GLB,, | Performed by: FAMILY MEDICINE

## 2024-06-06 PROCEDURE — 1160F RVW MEDS BY RX/DR IN RCRD: CPT | Mod: CPTII,S$GLB,, | Performed by: FAMILY MEDICINE

## 2024-06-06 PROCEDURE — 3061F NEG MICROALBUMINURIA REV: CPT | Mod: CPTII,S$GLB,, | Performed by: FAMILY MEDICINE

## 2024-06-06 PROCEDURE — 99999 PR PBB SHADOW E&M-EST. PATIENT-LVL IV: CPT | Mod: PBBFAC,,, | Performed by: FAMILY MEDICINE

## 2024-06-06 PROCEDURE — 99214 OFFICE O/P EST MOD 30 MIN: CPT | Mod: S$GLB,,, | Performed by: FAMILY MEDICINE

## 2024-06-06 PROCEDURE — 1159F MED LIST DOCD IN RCRD: CPT | Mod: CPTII,S$GLB,, | Performed by: FAMILY MEDICINE

## 2024-06-06 PROCEDURE — G2211 COMPLEX E/M VISIT ADD ON: HCPCS | Mod: S$GLB,,, | Performed by: FAMILY MEDICINE

## 2024-06-06 PROCEDURE — G0009 ADMIN PNEUMOCOCCAL VACCINE: HCPCS | Mod: S$GLB,,, | Performed by: FAMILY MEDICINE

## 2024-06-06 RX ORDER — OMEPRAZOLE 20 MG/1
20 CAPSULE, DELAYED RELEASE ORAL DAILY
Qty: 90 CAPSULE | Refills: 3 | OUTPATIENT
Start: 2024-06-06

## 2024-06-06 RX ORDER — TRAZODONE HYDROCHLORIDE 50 MG/1
50 TABLET ORAL NIGHTLY
Qty: 90 TABLET | Refills: 2 | Status: SHIPPED | OUTPATIENT
Start: 2024-06-06

## 2024-06-06 RX ORDER — METFORMIN HYDROCHLORIDE 500 MG/1
500 TABLET ORAL 2 TIMES DAILY WITH MEALS
Qty: 180 TABLET | Refills: 2 | Status: SHIPPED | OUTPATIENT
Start: 2024-06-06

## 2024-06-06 RX ORDER — OMEPRAZOLE 20 MG/1
20 CAPSULE, DELAYED RELEASE ORAL DAILY
Qty: 30 CAPSULE | Refills: 3 | Status: SHIPPED | OUTPATIENT
Start: 2024-06-06

## 2024-06-06 RX ORDER — TETANUS TOXOID, REDUCED DIPHTHERIA TOXOID AND ACELLULAR PERTUSSIS VACCINE, ADSORBED 5; 2.5; 8; 8; 2.5 [IU]/.5ML; [IU]/.5ML; UG/.5ML; UG/.5ML; UG/.5ML
0.5 SUSPENSION INTRAMUSCULAR ONCE
Qty: 0.5 ML | Refills: 0 | Status: SHIPPED | OUTPATIENT
Start: 2024-06-06 | End: 2024-06-06

## 2024-06-06 RX ORDER — TAMSULOSIN HYDROCHLORIDE 0.4 MG/1
0.4 CAPSULE ORAL DAILY
Qty: 90 CAPSULE | Refills: 3 | Status: SHIPPED | OUTPATIENT
Start: 2024-06-06 | End: 2025-06-06

## 2024-06-06 RX ORDER — LORATADINE 10 MG/1
10 TABLET ORAL DAILY
Qty: 90 TABLET | Refills: 3 | Status: SHIPPED | OUTPATIENT
Start: 2024-06-06

## 2024-06-06 RX ORDER — ATORVASTATIN CALCIUM 20 MG/1
20 TABLET, FILM COATED ORAL DAILY
Qty: 90 TABLET | Refills: 2 | Status: SHIPPED | OUTPATIENT
Start: 2024-06-06

## 2024-06-17 ENCOUNTER — OUTPATIENT CASE MANAGEMENT (OUTPATIENT)
Dept: ADMINISTRATIVE | Facility: OTHER | Age: 59
End: 2024-06-17
Payer: MEDICARE

## 2024-06-17 NOTE — PROGRESS NOTES
DUNIA contacted patient legal guardian mother Ms. Munoz. DUNIA explained to mother  reason for referral to obtain a cell phone. Mother denied this was a barrier. Mother reports needing assistance with caring for patient maybe some daytime assistance. Per chart review resource  was provided to mother to assist with ADL's etc. Patient reports daughter tried to call but wasn't able to speak with anyone. Mother an agreement with DUNIA contacting her on 06/20/2024 to assist with applying for CCW program

## 2024-06-20 ENCOUNTER — OUTPATIENT CASE MANAGEMENT (OUTPATIENT)
Dept: ADMINISTRATIVE | Facility: OTHER | Age: 59
End: 2024-06-20
Payer: MEDICARE

## 2024-06-20 NOTE — PROGRESS NOTES
SW received a return call from patient( guardian mother). Mother advised SW she missed a call from her earlier. SW preceded to ask mother additional questions regarding patient care. Mother reports patient was in SPED classes growing up . Mother reports patient was receiving assistance from the state years ago and was provided his own home and workers, but patient wasn't compliant. Mother believes patient was discharged from services. Mother reports patient has a learning disability and now he is blind .     SW and mother attempt to reach [facility] regarding services., however no answer. DUNIA left a message for a return call. DUNIA will follow-up with patient  mother once she receives a call back from [facility].

## 2024-06-21 ENCOUNTER — OUTPATIENT CASE MANAGEMENT (OUTPATIENT)
Dept: ADMINISTRATIVE | Facility: OTHER | Age: 59
End: 2024-06-21

## 2024-06-21 NOTE — PROGRESS NOTES
SW attempt to reach [employer], however no answer. SW left a message for a return call.

## 2024-06-26 NOTE — PROGRESS NOTES
"  Patient Name: Dallas Lawson Jr.    : 1965  MRN: [id number]      Subjective:     Patient ID: Dallas is a 58 y.o. male    Chief Complaint:  Follow-up    History of Present Illness  The patient presents for follow-up of multiple medical concerns, including diabetes, dyslipidemia, and insomnia He is accompanied by his mother.    Currently taking medications as prescribed.  No side effects reported.      Review of Systems   Constitutional:  Negative for unexpected weight change.   HENT:  Negative for sore throat.    Eyes:  Positive for visual disturbance.   Respiratory:  Negative for shortness of breath.    Cardiovascular:  Negative for chest pain.   Gastrointestinal:  Negative for abdominal pain and blood in stool.   Endocrine: Negative for cold intolerance and heat intolerance.   Genitourinary:  Negative for dysuria and frequency.   Integumentary:  Negative for rash.   Neurological:  Negative for weakness, numbness and headaches.   Hematological:  Negative for adenopathy.   Psychiatric/Behavioral:  Negative for suicidal ideas.         Objective:   /62 (BP Location: Left arm, Patient Position: Sitting, BP Method: Large (Manual))   Pulse 70   Resp 18   Ht 5' 8" (1.727 m)   Wt 100.7 kg (222 lb 0.1 oz)   SpO2 97%   BMI 33.76 kg/m²     Physical Exam    Physical Exam  Vitals reviewed.   Constitutional:       General: He is not in acute distress.  HENT:      Head: Normocephalic and atraumatic.      Right Ear: Ear canal and external ear normal.      Left Ear: Ear canal and external ear normal.      Nose: Nose normal.      Mouth/Throat:      Mouth: Mucous membranes are moist.      Pharynx: No oropharyngeal exudate or posterior oropharyngeal erythema.   Eyes:      Extraocular Movements: Extraocular movements intact.      Conjunctiva/sclera: Conjunctivae normal.      Pupils: Pupils are equal, round, and reactive to light.   Cardiovascular:      Rate and Rhythm: Normal rate and regular rhythm.      Pulses: Normal " pulses.      Heart sounds: Normal heart sounds.   Pulmonary:      Effort: Pulmonary effort is normal. No respiratory distress.      Breath sounds: No wheezing or rales.   Abdominal:      General: Abdomen is flat. Bowel sounds are normal. There is no distension.      Palpations: Abdomen is soft.      Tenderness: There is no abdominal tenderness. There is no guarding.   Musculoskeletal:      Cervical back: Normal range of motion. No rigidity or tenderness.   Lymphadenopathy:      Cervical: No cervical adenopathy.   Skin:     General: Skin is warm.      Capillary Refill: Capillary refill takes less than 2 seconds.   Neurological:      Mental Status: He is alert and oriented to person, place, and time.      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      Sensory: No sensory deficit.   Psychiatric:         Mood and Affect: Mood normal.         Behavior: Behavior normal.       Protective Sensation (w/ 10 gram monofilament):  Right: Intact  Left: Intact    Visual Inspection:  Normal -  Bilateral    Pedal Pulses:   Right: Present  Left: Present    Posterior Tibialis Pulses:   Right:Present  Left: Present    Lab Visit on 03/28/2024   Component Date Value Ref Range Status    Calcium 03/28/2024 9.8  8.7 - 10.5 mg/dL Final   Lab Visit on 03/28/2024   Component Date Value Ref Range Status    Specimen UA 03/28/2024 Urine, Clean Catch   Final    Color, UA 03/28/2024 Yellow  Yellow, Straw, Lauren Final    Appearance, UA 03/28/2024 Clear  Clear Final    pH, UA 03/28/2024 6.0  5.0 - 8.0 Final    Specific Gravity, UA 03/28/2024 >1.030 (A)  1.005 - 1.030 Final    Protein, UA 03/28/2024 Trace (A)  Negative Final    Comment: Recommend a 24 hour urine protein or a urine   protein/creatinine ratio if globulin induced proteinuria is  clinically suspected.      Glucose, UA 03/28/2024 Negative  Negative Final    Ketones, UA 03/28/2024 Negative  Negative Final    Bilirubin (UA) 03/28/2024 Negative  Negative Final     Occult Blood UA 03/28/2024 Trace (A)  Negative Final    Nitrite, UA 03/28/2024 Negative  Negative Final    Leukocytes, UA 03/28/2024 Negative  Negative Final    Urine Culture, Routine 03/28/2024 Multiple organisms isolated. None in predominance.  Repeat if   Final    Urine Culture, Routine 03/28/2024 clinically necessary.   Final   Lab Visit on 03/22/2024   Component Date Value Ref Range Status    Microalbumin, Urine 03/22/2024 13.0  ug/mL Final    Creatinine, Urine 03/22/2024 379.0 (H)  23.0 - 375.0 mg/dL Final    Microalb/Creat Ratio 03/22/2024 3.4  0.0 - 30.0 ug/mg Final   Lab Visit on 03/22/2024   Component Date Value Ref Range Status    WBC 03/22/2024 5.41  3.90 - 12.70 K/uL Final    RBC 03/22/2024 4.83  4.60 - 6.20 M/uL Final    Hemoglobin 03/22/2024 14.8  14.0 - 18.0 g/dL Final    Hematocrit 03/22/2024 47.4  40.0 - 54.0 % Final    MCV 03/22/2024 98  82 - 98 fL Final    MCH 03/22/2024 30.6  27.0 - 31.0 pg Final    MCHC 03/22/2024 31.2 (L)  32.0 - 36.0 g/dL Final    RDW 03/22/2024 13.4  11.5 - 14.5 % Final    Platelets 03/22/2024 201  150 - 450 K/uL Final    MPV 03/22/2024 12.0  9.2 - 12.9 fL Final    Immature Granulocytes 03/22/2024 0.2  0.0 - 0.5 % Final    Gran # (ANC) 03/22/2024 2.1  1.8 - 7.7 K/uL Final    Immature Grans (Abs) 03/22/2024 0.01  0.00 - 0.04 K/uL Final    Comment: Mild elevation in immature granulocytes is non specific and   can be seen in a variety of conditions including stress response,   acute inflammation, trauma and pregnancy. Correlation with other   laboratory and clinical findings is essential.      Lymph # 03/22/2024 2.5  1.0 - 4.8 K/uL Final    Mono # 03/22/2024 0.5  0.3 - 1.0 K/uL Final    Eos # 03/22/2024 0.3  0.0 - 0.5 K/uL Final    Baso # 03/22/2024 0.05  0.00 - 0.20 K/uL Final    nRBC 03/22/2024 0  0 /100 WBC Final    Gran % 03/22/2024 39.4  38.0 - 73.0 % Final    Lymph % 03/22/2024 45.3  18.0 - 48.0 % Final    Mono % 03/22/2024 8.7  4.0 - 15.0 % Final    Eosinophil %  03/22/2024 5.5  0.0 - 8.0 % Final    Basophil % 03/22/2024 0.9  0.0 - 1.9 % Final    Differential Method 03/22/2024 Automated   Final    Sodium 03/22/2024 142  136 - 145 mmol/L Final    Potassium 03/22/2024 4.4  3.5 - 5.1 mmol/L Final    Chloride 03/22/2024 103  95 - 110 mmol/L Final    CO2 03/22/2024 30 (H)  23 - 29 mmol/L Final    Glucose 03/22/2024 96  70 - 110 mg/dL Final    BUN 03/22/2024 18  6 - 20 mg/dL Final    Creatinine 03/22/2024 1.3  0.5 - 1.4 mg/dL Final    Calcium 03/22/2024 10.6 (H)  8.7 - 10.5 mg/dL Final    Total Protein 03/22/2024 7.3  6.0 - 8.4 g/dL Final    Albumin 03/22/2024 4.1  3.5 - 5.2 g/dL Final    Total Bilirubin 03/22/2024 0.5  0.1 - 1.0 mg/dL Final    Comment: For infants and newborns, interpretation of results should be based  on gestational age, weight and in agreement with clinical  observations.    Premature Infant recommended reference ranges:  Up to 24 hours.............<8.0 mg/dL  Up to 48 hours............<12.0 mg/dL  3-5 days..................<15.0 mg/dL  6-29 days.................<15.0 mg/dL      Alkaline Phosphatase 03/22/2024 53 (L)  55 - 135 U/L Final    AST 03/22/2024 22  10 - 40 U/L Final    ALT 03/22/2024 22  10 - 44 U/L Final    eGFR 03/22/2024 >60.0  >60 mL/min/1.73 m^2 Final    Anion Gap 03/22/2024 9  8 - 16 mmol/L Final    Hemoglobin A1C 03/22/2024 6.0 (H)  4.0 - 5.6 % Final    Comment: ADA Screening Guidelines:  5.7-6.4%  Consistent with prediabetes  >or=6.5%  Consistent with diabetes    High levels of fetal hemoglobin interfere with the HbA1C  assay. Heterozygous hemoglobin variants (HbS, HgC, etc)do  not significantly interfere with this assay.   However, presence of multiple variants may affect accuracy.      Estimated Avg Glucose 03/22/2024 126  68 - 131 mg/dL Final    Cholesterol 03/22/2024 142  120 - 199 mg/dL Final    Comment: The National Cholesterol Education Program (NCEP) has set the  following guidelines (reference ranges) for  Cholesterol:  Optimal.....................<200 mg/dL  Borderline High.............200-239 mg/dL  High........................> or = 240 mg/dL      Triglycerides 03/22/2024 82  30 - 150 mg/dL Final    Comment: The National Cholesterol Education Program (NCEP) has set the  following guidelines (reference values) for triglycerides:  Normal......................<150 mg/dL  Borderline High.............150-199 mg/dL  High........................200-499 mg/dL      HDL 03/22/2024 48  40 - 75 mg/dL Final    Comment: The National Cholesterol Education Program (NCEP) has set the  following guidelines (reference values) for HDL Cholesterol:  Low...............<40 mg/dL  Optimal...........>60 mg/dL      LDL Cholesterol 03/22/2024 77.6  63.0 - 159.0 mg/dL Final    Comment: The National Cholesterol Education Program (NCEP) has set the  following guidelines (reference values) for LDL Cholesterol:  Optimal.......................<130 mg/dL  Borderline High...............130-159 mg/dL  High..........................160-189 mg/dL  Very High.....................>190 mg/dL      HDL/Cholesterol Ratio 03/22/2024 33.8  20.0 - 50.0 % Final    Total Cholesterol/HDL Ratio 03/22/2024 3.0  2.0 - 5.0 Final    Non-HDL Cholesterol 03/22/2024 94  mg/dL Final    Comment: Risk category and Non-HDL cholesterol goals:  Coronary heart disease (CHD)or equivalent (10-year risk of CHD >20%):  Non-HDL cholesterol goal     <130 mg/dL  Two or more CHD risk factors and 10-year risk of CHD <= 20%:  Non-HDL cholesterol goal     <160 mg/dL  0 to 1 CHD risk factor:  Non-HDL cholesterol goal     <190 mg/dL      PSA, Screen 03/22/2024 0.62  0.00 - 4.00 ng/mL Final    Comment: The testing method is a chemiluminescent microparticle immunoassay   manufactured by [employer] and performed on the    or   Partschannel system. Values obtained with different assay manufacturers   for   methods may be different and cannot be used interchangeably.  PSA Expected  levels:  Hormonal Therapy: <0.05 ng/ml  Prostatectomy: <0.01 ng/ml  Radiation Therapy: <1.00 ng/ml      HIV 1/2 Ag/Ab 03/22/2024 Non-reactive  Non-reactive Final    Hepatitis C Ab 03/22/2024 Non-reactive  Non-reactive Final          Assessment        ICD-10-CM ICD-9-CM   1. Type 2 diabetes mellitus with obesity  E11.69 250.00    E66.9 278.00   2. Dyslipidemia  E78.5 272.4   3. Urinary frequency  R35.0 788.41   4. Gastroesophageal reflux disease without esophagitis  K21.9 530.81   5. Chronic insomnia  F51.04 780.52   6. Chronic rhinitis  J31.0 472.0   7. Need for vaccination  Z23 V05.9   8. Screening for colorectal cancer  Z12.11 V76.51    Z12.12 V76.41         Plan:     1. Type 2 diabetes mellitus with obesity  Overview:  Lab Results   Component Value Date    HGBA1C 6.0 (H) 03/22/2024    HGBA1C 6.2 (H) 08/10/2023     Lab Results   Component Value Date    LDLCALC 77.6 03/22/2024     Lab Results   Component Value Date    MICALBCREAT 3.4 03/22/2024       Assessment & Plan:  A1c is controlled with current medication.  Continue current regimen.    Orders:  -     metFORMIN (GLUCOPHAGE) 500 MG tablet; Take 1 tablet (500 mg total) by mouth 2 (two) times daily with meals.  Dispense: 180 tablet; Refill: 2    2. Dyslipidemia  Overview:  Lab Results   Component Value Date    CHOL 142 03/22/2024    CHOL 180 08/10/2023    CHOL 200 06/26/2014     Lab Results   Component Value Date    HDL 48 03/22/2024    HDL 46 08/10/2023    HDL 55 06/26/2014     Lab Results   Component Value Date    LDLCALC 77.6 03/22/2024    LDLCALC 118.4 08/10/2023    LDLCALC 118 06/26/2014     Lab Results   Component Value Date    TRIG 82 03/22/2024    TRIG 78 08/10/2023    TRIG 134 06/26/2014     Lab Results   Component Value Date    CHOLHDL 33.8 03/22/2024    CHOLHDL 25.6 08/10/2023    CHOLHDL 3.6 06/26/2014      The 10-year ASCVD risk score ([place] DK, et al., 2019) is: 11.3%    Values used to calculate the score:      Age: 58 years      Sex: Male      Is  Non- : Yes      Diabetic: Yes      Tobacco smoker: No      Systolic Blood Pressure: 122 mmHg      Is BP treated: No      HDL Cholesterol: 48 mg/dL      Total Cholesterol: 142 mg/dL      Assessment & Plan:  Continue atorvastatin 20 milligrams daily.    Orders:  -     atorvastatin (LIPITOR) 20 MG tablet; Take 1 tablet (20 mg total) by mouth once daily.  Dispense: 90 tablet; Refill: 2    3. Urinary frequency  -     tamsulosin (FLOMAX) 0.4 mg Cap; Take 1 capsule (0.4 mg total) by mouth once daily.  Dispense: 90 capsule; Refill: 3  Continue flomax    4. Gastroesophageal reflux disease without esophagitis  -     omeprazole (PRILOSEC) 20 MG capsule; Take 1 capsule (20 mg total) by mouth once daily.  Dispense: 30 capsule; Refill: 3    5. Chronic insomnia  -     traZODone (DESYREL) 50 MG tablet; Take 1 tablet (50 mg total) by mouth every evening.  Dispense: 90 tablet; Refill: 2  Continue trazodone    6. Chronic rhinitis  -     loratadine (CLARITIN) 10 mg tablet; Take 1 tablet (10 mg total) by mouth once daily.  Dispense: 90 tablet; Refill: 3  Continue claritin    7. Need for vaccination  -     pneumoc 20-anurag conj-dip cr(PF) (PREVNAR-20 (PF)) injection Syrg 0.5 mL  -     diphth,pertus,acell,,tetanus (BOOSTRIX TDAP) 2.5-8-5 Lf-mcg-Lf/0.5mL Syrg injection; Inject 0.5 mLs into the muscle once. for 1 dose  Dispense: 0.5 mL; Refill: 0    8. Screening for colorectal cancer  -     Ambulatory referral/consult to Endo Procedure ; Future; Expected date: 06/07/2024             -Ananda Kwong Jr., MD, AAHIVS      This note was generated with the assistance of ambient listening technology. Verbal consent was obtained by the patient and accompanying visitor(s) for the recording of patient appointment to facilitate this note. I attest to having reviewed and edited the generated note for accuracy, though some syntax or spelling errors may persist. Please contact the author of this note for any  clarification.          There are no Patient Instructions on file for this visit.    Follow Up  No follow-ups on file.    Future Appointments   Date Time Provider Department Center   8/12/2024  2:40 PM PRE-ADMIT NURSE 2, ENDO -[facility]   9/24/2024  8:30 AM [facility] LAB [facility]   10/1/2024  2:40 PM Ananda Kwong Jr., MD [facility]

## 2024-06-28 ENCOUNTER — OUTPATIENT CASE MANAGEMENT (OUTPATIENT)
Dept: ADMINISTRATIVE | Facility: OTHER | Age: 59
End: 2024-06-28
Payer: MEDICARE

## 2024-06-28 NOTE — PROGRESS NOTES
12:36 SW attempt to reach some one with [employer] [phone] program regarding referral and or if patient is connected to service, however no answer. SW left a message for a return call.

## 2024-07-30 DIAGNOSIS — E11.69 TYPE 2 DIABETES MELLITUS WITH OBESITY: Chronic | ICD-10-CM

## 2024-07-30 DIAGNOSIS — E78.5 DYSLIPIDEMIA: Chronic | ICD-10-CM

## 2024-07-30 DIAGNOSIS — J34.2 NASAL SEPTAL DEVIATION: ICD-10-CM

## 2024-07-30 DIAGNOSIS — J31.0 CHRONIC RHINITIS: Chronic | ICD-10-CM

## 2024-07-30 DIAGNOSIS — E66.9 TYPE 2 DIABETES MELLITUS WITH OBESITY: Chronic | ICD-10-CM

## 2024-07-30 NOTE — TELEPHONE ENCOUNTER
Care Due:                  Date            Visit Type   Department     Provider  --------------------------------------------------------------------------------                                [facility] FAM[facility]                              PRIMARY      MEDICINE/  Last Visit: 06-      CARE ([facility])   INTERNAL AURELIA Kwong                              [facility]                              PRIMARY      MEDICINE/  Next Visit: 10-      CARE ([facility])   INTERNAL MED   Ananda Kwong                                                            Last  Test          Frequency    Reason                     Performed    Due Date  --------------------------------------------------------------------------------    HBA1C.......  6 months...  metFORMIN................  03- 09-    [facility] Embedded Care Due Messages. Reference number: [id number].   7/30/2024 4:10:23 PM CDT

## 2024-07-31 RX ORDER — LORATADINE 10 MG/1
10 TABLET ORAL DAILY
Qty: 90 TABLET | Refills: 3 | Status: SHIPPED | OUTPATIENT
Start: 2024-07-31

## 2024-07-31 RX ORDER — AZELASTINE 1 MG/ML
1 SPRAY, METERED NASAL 2 TIMES DAILY
Qty: 30 ML | Refills: 0 | Status: SHIPPED | OUTPATIENT
Start: 2024-07-31 | End: 2024-08-30

## 2024-07-31 RX ORDER — METFORMIN HYDROCHLORIDE 500 MG/1
500 TABLET ORAL 2 TIMES DAILY WITH MEALS
Qty: 180 TABLET | Refills: 2 | Status: SHIPPED | OUTPATIENT
Start: 2024-07-31

## 2024-07-31 RX ORDER — ATORVASTATIN CALCIUM 20 MG/1
20 TABLET, FILM COATED ORAL DAILY
Qty: 90 TABLET | Refills: 2 | Status: SHIPPED | OUTPATIENT
Start: 2024-07-31

## 2024-08-06 ENCOUNTER — PATIENT MESSAGE (OUTPATIENT)
Dept: FAMILY MEDICINE | Facility: CLINIC | Age: 59
End: 2024-08-06
Payer: MEDICARE

## 2024-08-12 ENCOUNTER — TELEPHONE (OUTPATIENT)
Dept: ENDOSCOPY | Facility: HOSPITAL | Age: 59
End: 2024-08-12

## 2024-08-12 ENCOUNTER — CLINICAL SUPPORT (OUTPATIENT)
Dept: ENDOSCOPY | Facility: HOSPITAL | Age: 59
End: 2024-08-12
Attending: FAMILY MEDICINE
Payer: MEDICARE

## 2024-08-12 VITALS — WEIGHT: 222 LBS | BODY MASS INDEX: 33.65 KG/M2 | HEIGHT: 68 IN

## 2024-08-12 DIAGNOSIS — Z12.11 SCREENING FOR COLORECTAL CANCER: ICD-10-CM

## 2024-08-12 DIAGNOSIS — Z12.12 SCREENING FOR COLORECTAL CANCER: ICD-10-CM

## 2024-08-12 RX ORDER — SODIUM, POTASSIUM,MAG SULFATES 17.5-3.13G
1 SOLUTION, RECONSTITUTED, ORAL ORAL DAILY
Qty: 1 KIT | Refills: 0 | Status: SHIPPED | OUTPATIENT
Start: 2024-08-12 | End: 2024-08-14

## 2024-08-12 NOTE — TELEPHONE ENCOUNTER
Spoke to patient's sister Abi Patel to schedule procedure(s) Colonoscopy       Physician to perform procedure(s) Dr. Allen  Date of Procedure (s) 10/23/24  Arrival Time 11:30 AM  Time of Procedure(s) 12:30 PM   Location of Procedure(s) [place] 4th Floor  Type of Rx Prep sent to patient: Suprep  Instructions provided to patient via Email/portal    Patient was informed on the following information and verbalized understanding. Screening questionnaire reviewed with patient and complete. If procedure requires anesthesia, a responsible adult needs to be present to accompany the patient home, patient cannot drive after receiving anesthesia. Appointment details are tentative, especially check-in time. Patient will receive a prep-op call 7 days prior to confirm check-in time for procedure. If applicable the patient should contact their pharmacy to verify Rx for procedure prep is ready for pick-up. Patient was advised to call the scheduling department at [phone] if pharmacy states no Rx is available. Patient was advised to call the endoscopy scheduling department if any questions or concerns arise.      [facility] Endoscopy Scheduling Department

## 2024-08-14 ENCOUNTER — OFFICE VISIT (OUTPATIENT)
Dept: OPTOMETRY | Facility: CLINIC | Age: 59
End: 2024-08-14
Payer: MEDICARE

## 2024-08-14 ENCOUNTER — PATIENT MESSAGE (OUTPATIENT)
Dept: FAMILY MEDICINE | Facility: CLINIC | Age: 59
End: 2024-08-14
Payer: MEDICARE

## 2024-08-14 DIAGNOSIS — H54.3 BLIND IN BOTH EYES: ICD-10-CM

## 2024-08-14 DIAGNOSIS — H40.1133 PRIMARY OPEN ANGLE GLAUCOMA (POAG) OF BOTH EYES, SEVERE STAGE: ICD-10-CM

## 2024-08-14 DIAGNOSIS — Q14.1: Primary | ICD-10-CM

## 2024-08-14 PROCEDURE — 1160F RVW MEDS BY RX/DR IN RCRD: CPT | Mod: HCNC,CPTII,S$GLB, | Performed by: OPTOMETRIST

## 2024-08-14 PROCEDURE — 1159F MED LIST DOCD IN RCRD: CPT | Mod: HCNC,CPTII,S$GLB, | Performed by: OPTOMETRIST

## 2024-08-14 PROCEDURE — 3044F HG A1C LEVEL LT 7.0%: CPT | Mod: HCNC,CPTII,S$GLB, | Performed by: OPTOMETRIST

## 2024-08-14 PROCEDURE — 99999 PR PBB SHADOW E&M-EST. PATIENT-LVL III: CPT | Mod: PBBFAC,HCNC,, | Performed by: OPTOMETRIST

## 2024-08-14 PROCEDURE — 3061F NEG MICROALBUMINURIA REV: CPT | Mod: HCNC,CPTII,S$GLB, | Performed by: OPTOMETRIST

## 2024-08-14 PROCEDURE — 92004 COMPRE OPH EXAM NEW PT 1/>: CPT | Mod: HCNC,S$GLB,, | Performed by: OPTOMETRIST

## 2024-08-14 PROCEDURE — 3066F NEPHROPATHY DOC TX: CPT | Mod: HCNC,CPTII,S$GLB, | Performed by: OPTOMETRIST

## 2024-08-14 RX ORDER — TETANUS TOXOID, REDUCED DIPHTHERIA TOXOID AND ACELLULAR PERTUSSIS VACCINE, ADSORBED 5; 2.5; 8; 8; 2.5 [IU]/.5ML; [IU]/.5ML; UG/.5ML; UG/.5ML; UG/.5ML
SUSPENSION INTRAMUSCULAR
COMMUNITY
Start: 2024-07-12

## 2024-08-14 RX ORDER — PNEUMOCOCCAL 20-VALENT CONJUGATE VACCINE 2.2; 2.2; 2.2; 2.2; 2.2; 2.2; 2.2; 2.2; 2.2; 2.2; 2.2; 2.2; 2.2; 2.2; 2.2; 2.2; 4.4; 2.2; 2.2; 2.2 UG/.5ML; UG/.5ML; UG/.5ML; UG/.5ML; UG/.5ML; UG/.5ML; UG/.5ML; UG/.5ML; UG/.5ML; UG/.5ML; UG/.5ML; UG/.5ML; UG/.5ML; UG/.5ML; UG/.5ML; UG/.5ML; UG/.5ML; UG/.5ML; UG/.5ML; UG/.5ML
INJECTION, SUSPENSION INTRAMUSCULAR
COMMUNITY
Start: 2024-06-06

## 2024-08-14 RX ORDER — DORZOLAMIDE HYDROCHLORIDE AND TIMOLOL MALEATE 20; 5 MG/ML; MG/ML
1 SOLUTION/ DROPS OPHTHALMIC 2 TIMES DAILY
Qty: 30 ML | Refills: 3 | Status: SHIPPED | OUTPATIENT
Start: 2024-08-14 | End: 2024-08-14

## 2024-08-14 RX ORDER — DORZOLAMIDE HYDROCHLORIDE AND TIMOLOL MALEATE 20; 5 MG/ML; MG/ML
1 SOLUTION/ DROPS OPHTHALMIC 2 TIMES DAILY
Qty: 30 ML | Refills: 3 | Status: SHIPPED | OUTPATIENT
Start: 2024-08-14 | End: 2025-08-14

## 2024-08-15 NOTE — PROGRESS NOTES
IJEOMA NORIEGA: March of this year elsewhere  Chief complaint (CC): Patient is here for an exam today. Patient had been   seeing another doctor for glaucoma and was using drops but ran out and   needs refills. Patient had been to the [email or web address] in 2016 for a short   period of time and has  a cane but has some trouble navigating sometimes   with it.  Patient was living with his Mother and she recently passed and   is now with his sister and niece.  Patient would like a referral back to   [facility] for additional cane training and tools to help him with basic   needs.  Patient has not noticed any recent vision changes or eye pain.   Patient is here with his niece who called her Mother to ask his last   doctors name and she didn't think he has seen anyone for at least 3 years.  Glasses? -  Contacts? -  H/o eye surgery, injections or laser: -  H/o eye injury: -  Known eye conditions? See above  Family h/o eye conditions? -  Eye gtts? -      (-) Flashes (-)  Floaters (-) Mucous   (-)  Tearing (-) Itching (-) Burning   (-) Headaches (-) Eye Pain/discomfort (-) Irritation   (-)  Redness (-) Double vision (-) Blurry vision    Diabetic? + pt. doesn't check BS home because he is unable to see  A1c? Hemoglobin A1C       Date                     Value               Ref Range             Status                03/22/2024               6.0 (H)             4.0 - 5.6 %           Final                 08/10/2023               6.2 (H)             4.0 - 5.6 %           Final                    Last edited by Reva Geller on 8/14/2024  8:50 AM.            Assessment /Plan     For exam results, see Encounter Report.    Congenital retinal disorder    Primary open angle glaucoma (POAG) of both eyes, severe stage  -     Ambulatory referral/consult to Optometry  -     Discontinue: dorzolamide-timolol 2-0.5% (COSOPT) 22.3-6.8 mg/mL ophthalmic solution; Place 1 drop into both eyes 2 (two) times daily.  Dispense: 30 mL; Refill: 3  -      "dorzolamide-timolol 2-0.5% (COSOPT) 22.3-6.8 mg/mL ophthalmic solution; Place 1 drop into both eyes 2 (two) times daily.  Dispense: 30 mL; Refill: 3    Blind in both eyes    Pt presents w/concern for glaucoma. Pt has not taken his eyedrops for 5 mo or more. Pt last seen at outside clinic sometime last year. Pt prev lived with his mother but since her passing, he now lives with his sister and his niece (with him today). Pt reports poor vision his entire life. Pt made aware that his vision has declined since last seen at [facility] in 2020. Pt is NLP OD and LP OS. Pt would like to preserve LP. Pt reports issues with maneuvering with stick for blindness. . Pt reports being declined SS due to vision since it has been reported that he has "some vision". Last letter sent to SS by PCP a few months ago. Pt encouraged to write an appeal. Pt's niece would like pt to have visual training to promote more independence so he can help himself and be more helpful in the home. Pt shows difficulty with knowing how to use mobility stick in office today. Pt would like a referral back to [facility]. Poor visual prognosis today. Rx Cosopt BID OU to help decrease IOP and preserve LP OS. RTC 6 weeks IOP check. Referral to [facility].                      "

## 2024-08-16 ENCOUNTER — PATIENT MESSAGE (OUTPATIENT)
Dept: FAMILY MEDICINE | Facility: CLINIC | Age: 59
End: 2024-08-16

## 2024-08-16 ENCOUNTER — OFFICE VISIT (OUTPATIENT)
Dept: FAMILY MEDICINE | Facility: CLINIC | Age: 59
End: 2024-08-16
Payer: MEDICARE

## 2024-08-16 ENCOUNTER — LAB VISIT (OUTPATIENT)
Dept: LAB | Facility: HOSPITAL | Age: 59
End: 2024-08-16
Attending: FAMILY MEDICINE
Payer: MEDICARE

## 2024-08-16 VITALS
DIASTOLIC BLOOD PRESSURE: 82 MMHG | SYSTOLIC BLOOD PRESSURE: 126 MMHG | OXYGEN SATURATION: 99 % | HEART RATE: 87 BPM | HEIGHT: 68 IN | RESPIRATION RATE: 18 BRPM | BODY MASS INDEX: 33.18 KG/M2 | WEIGHT: 218.94 LBS

## 2024-08-16 DIAGNOSIS — H54.3 BLIND IN BOTH EYES: ICD-10-CM

## 2024-08-16 DIAGNOSIS — E11.69 TYPE 2 DIABETES MELLITUS WITH OBESITY: Primary | Chronic | ICD-10-CM

## 2024-08-16 DIAGNOSIS — J31.0 CHRONIC RHINITIS: Chronic | ICD-10-CM

## 2024-08-16 DIAGNOSIS — E78.5 DYSLIPIDEMIA: Chronic | ICD-10-CM

## 2024-08-16 DIAGNOSIS — J34.2 NASAL SEPTAL DEVIATION: ICD-10-CM

## 2024-08-16 DIAGNOSIS — E11.69 TYPE 2 DIABETES MELLITUS WITH OBESITY: Chronic | ICD-10-CM

## 2024-08-16 DIAGNOSIS — E66.9 TYPE 2 DIABETES MELLITUS WITH OBESITY: Chronic | ICD-10-CM

## 2024-08-16 DIAGNOSIS — F43.20 ADJUSTMENT DISORDER, UNSPECIFIED TYPE: ICD-10-CM

## 2024-08-16 DIAGNOSIS — E66.9 TYPE 2 DIABETES MELLITUS WITH OBESITY: Primary | Chronic | ICD-10-CM

## 2024-08-16 DIAGNOSIS — Z55.6 PROBLEM RELATED TO HEALTH LITERACY: ICD-10-CM

## 2024-08-16 LAB
ALBUMIN SERPL BCP-MCNC: 4 G/DL (ref 3.5–5.2)
ALP SERPL-CCNC: 60 U/L (ref 55–135)
ALT SERPL W/O P-5'-P-CCNC: 21 U/L (ref 10–44)
ANION GAP SERPL CALC-SCNC: 7 MMOL/L (ref 8–16)
AST SERPL-CCNC: 23 U/L (ref 10–40)
BASOPHILS # BLD AUTO: 0.05 K/UL (ref 0–0.2)
BASOPHILS NFR BLD: 1.2 % (ref 0–1.9)
BILIRUB SERPL-MCNC: 0.6 MG/DL (ref 0.1–1)
BUN SERPL-MCNC: 14 MG/DL (ref 6–20)
CALCIUM SERPL-MCNC: 9.9 MG/DL (ref 8.7–10.5)
CHLORIDE SERPL-SCNC: 103 MMOL/L (ref 95–110)
CHOLEST SERPL-MCNC: 156 MG/DL (ref 120–199)
CHOLEST/HDLC SERPL: 2.6 {RATIO} (ref 2–5)
CO2 SERPL-SCNC: 30 MMOL/L (ref 23–29)
CREAT SERPL-MCNC: 1.1 MG/DL (ref 0.5–1.4)
DIFFERENTIAL METHOD BLD: ABNORMAL
EOSINOPHIL # BLD AUTO: 0.1 K/UL (ref 0–0.5)
EOSINOPHIL NFR BLD: 2.2 % (ref 0–8)
ERYTHROCYTE [DISTWIDTH] IN BLOOD BY AUTOMATED COUNT: 14.4 % (ref 11.5–14.5)
EST. GFR  (NO RACE VARIABLE): >60 ML/MIN/1.73 M^2
GLUCOSE SERPL-MCNC: 80 MG/DL (ref 70–110)
HCT VFR BLD AUTO: 45.3 % (ref 40–54)
HDLC SERPL-MCNC: 60 MG/DL (ref 40–75)
HDLC SERPL: 38.5 % (ref 20–50)
HGB BLD-MCNC: 14.1 G/DL (ref 14–18)
IMM GRANULOCYTES # BLD AUTO: 0 K/UL (ref 0–0.04)
IMM GRANULOCYTES NFR BLD AUTO: 0 % (ref 0–0.5)
LDLC SERPL CALC-MCNC: 82 MG/DL (ref 63–159)
LYMPHOCYTES # BLD AUTO: 1.8 K/UL (ref 1–4.8)
LYMPHOCYTES NFR BLD: 44.4 % (ref 18–48)
MCH RBC QN AUTO: 30.1 PG (ref 27–31)
MCHC RBC AUTO-ENTMCNC: 31.1 G/DL (ref 32–36)
MCV RBC AUTO: 97 FL (ref 82–98)
MONOCYTES # BLD AUTO: 0.5 K/UL (ref 0.3–1)
MONOCYTES NFR BLD: 12.1 % (ref 4–15)
NEUTROPHILS # BLD AUTO: 1.7 K/UL (ref 1.8–7.7)
NEUTROPHILS NFR BLD: 40.1 % (ref 38–73)
NONHDLC SERPL-MCNC: 96 MG/DL
NRBC BLD-RTO: 0 /100 WBC
PLATELET # BLD AUTO: 201 K/UL (ref 150–450)
PMV BLD AUTO: 11.8 FL (ref 9.2–12.9)
POTASSIUM SERPL-SCNC: 4.2 MMOL/L (ref 3.5–5.1)
PROT SERPL-MCNC: 7.4 G/DL (ref 6–8.4)
RBC # BLD AUTO: 4.69 M/UL (ref 4.6–6.2)
SODIUM SERPL-SCNC: 140 MMOL/L (ref 136–145)
TRIGL SERPL-MCNC: 70 MG/DL (ref 30–150)
WBC # BLD AUTO: 4.12 K/UL (ref 3.9–12.7)

## 2024-08-16 PROCEDURE — 3066F NEPHROPATHY DOC TX: CPT | Mod: HCNC,CPTII,S$GLB, | Performed by: FAMILY MEDICINE

## 2024-08-16 PROCEDURE — 3008F BODY MASS INDEX DOCD: CPT | Mod: HCNC,CPTII,S$GLB, | Performed by: FAMILY MEDICINE

## 2024-08-16 PROCEDURE — 36415 COLL VENOUS BLD VENIPUNCTURE: CPT | Mod: HCNC,PN | Performed by: FAMILY MEDICINE

## 2024-08-16 PROCEDURE — 80061 LIPID PANEL: CPT | Mod: HCNC | Performed by: FAMILY MEDICINE

## 2024-08-16 PROCEDURE — 80053 COMPREHEN METABOLIC PANEL: CPT | Mod: HCNC | Performed by: FAMILY MEDICINE

## 2024-08-16 PROCEDURE — 3061F NEG MICROALBUMINURIA REV: CPT | Mod: HCNC,CPTII,S$GLB, | Performed by: FAMILY MEDICINE

## 2024-08-16 PROCEDURE — 3079F DIAST BP 80-89 MM HG: CPT | Mod: HCNC,CPTII,S$GLB, | Performed by: FAMILY MEDICINE

## 2024-08-16 PROCEDURE — 99999 PR PBB SHADOW E&M-EST. PATIENT-LVL V: CPT | Mod: PBBFAC,HCNC,, | Performed by: FAMILY MEDICINE

## 2024-08-16 PROCEDURE — 1159F MED LIST DOCD IN RCRD: CPT | Mod: HCNC,CPTII,S$GLB, | Performed by: FAMILY MEDICINE

## 2024-08-16 PROCEDURE — 1160F RVW MEDS BY RX/DR IN RCRD: CPT | Mod: HCNC,CPTII,S$GLB, | Performed by: FAMILY MEDICINE

## 2024-08-16 PROCEDURE — G2211 COMPLEX E/M VISIT ADD ON: HCPCS | Mod: HCNC,S$GLB,, | Performed by: FAMILY MEDICINE

## 2024-08-16 PROCEDURE — 85025 COMPLETE CBC W/AUTO DIFF WBC: CPT | Mod: HCNC | Performed by: FAMILY MEDICINE

## 2024-08-16 PROCEDURE — 3074F SYST BP LT 130 MM HG: CPT | Mod: HCNC,CPTII,S$GLB, | Performed by: FAMILY MEDICINE

## 2024-08-16 PROCEDURE — 3044F HG A1C LEVEL LT 7.0%: CPT | Mod: HCNC,CPTII,S$GLB, | Performed by: FAMILY MEDICINE

## 2024-08-16 PROCEDURE — 99214 OFFICE O/P EST MOD 30 MIN: CPT | Mod: HCNC,S$GLB,, | Performed by: FAMILY MEDICINE

## 2024-08-16 PROCEDURE — 83036 HEMOGLOBIN GLYCOSYLATED A1C: CPT | Mod: HCNC | Performed by: FAMILY MEDICINE

## 2024-08-16 RX ORDER — FLUTICASONE PROPIONATE 50 MCG
2 SPRAY, SUSPENSION (ML) NASAL DAILY
Qty: 16 ML | Refills: 11 | Status: SHIPPED | OUTPATIENT
Start: 2024-08-16

## 2024-08-16 RX ORDER — LORATADINE 10 MG/1
10 TABLET ORAL DAILY
Qty: 90 TABLET | Refills: 3 | Status: SHIPPED | OUTPATIENT
Start: 2024-08-16

## 2024-08-16 SDOH — SOCIAL DETERMINANTS OF HEALTH (SDOH): PROBLEMS RELATED TO HEALTH LITERACY: Z55.6

## 2024-08-16 NOTE — PATIENT INSTRUCTIONS
Please call  [phone]( [facility]) to schedule your behavioral health/mental health appointment

## 2024-08-17 LAB
ESTIMATED AVG GLUCOSE: 114 MG/DL (ref 68–131)
HBA1C MFR BLD: 5.6 % (ref 4–5.6)

## 2024-08-19 ENCOUNTER — PATIENT OUTREACH (OUTPATIENT)
Dept: ADMINISTRATIVE | Facility: OTHER | Age: 59
End: 2024-08-19
Payer: MEDICARE

## 2024-08-19 DIAGNOSIS — K21.9 GASTROESOPHAGEAL REFLUX DISEASE WITHOUT ESOPHAGITIS: Chronic | ICD-10-CM

## 2024-08-19 RX ORDER — OMEPRAZOLE 20 MG/1
20 CAPSULE, DELAYED RELEASE ORAL
Qty: 90 CAPSULE | Refills: 3 | Status: SHIPPED | OUTPATIENT
Start: 2024-08-19

## 2024-08-19 NOTE — TELEPHONE ENCOUNTER
Refill Decision Note   Dallas Lawson  is requesting a refill authorization.  Brief Assessment and Rationale for Refill:  Approve     Medication Therapy Plan:       Medication Reconciliation Completed: No   Comments:     No Care Gaps recommended.     Note composed:11:10 AM 08/19/2024

## 2024-08-19 NOTE — PROGRESS NOTES
CHW - Initial Contact    This Community Health Worker completed OR updated the Social Determinant of Health questionnaire with patient via telephone today.    Pt identified barriers of most importance are: Pt called back. He let me know about an appt that was scheduled for food stamps, I let the know what to do in order to get an appt the same day. I will maill food pantry info and I will sned a message to his provider for a order for a therapist. 3 wk f/u.    Referrals were put through [facility] - no: None  Support and Services: Pt called back. He let me know about an appt that was scheduled for food stamps, I let the know what to do in order to get an appt the same day. I will maill food pantry info and I will sned a message to his provider for a order for a therapist.   Other information discussed the patient needs / wants help with: Food pantry and therapist.    Follow up required: 3 weeks  Initial Outreach - Due: 10/1/2024          CHW - Outreach Attempt    Community Health Worker left a voicemail message for 2nd attempt to contact patient regarding: Left vm, 2nd attempt.   Community Health Worker to attempt to contact patient on:  9/23/24            CHW - Outreach Attempt    Community Health Worker left a voicemail message for 1st attempt to contact patient regarding: Assistance with resources  Community Health Worker to attempt to contact patient on: 8/26/24

## 2024-08-19 NOTE — TELEPHONE ENCOUNTER
No care due was identified.  [facility] Embedded Care Due Messages. Reference number: [id number].   8/19/2024 1:55:13 AM CDT

## 2024-08-19 NOTE — TELEPHONE ENCOUNTER
Unable to contact patient, patient mailbox is full.----- Message from Ananda Kwong Jr., MD sent at 8/18/2024  8:33 PM CDT -----  Lab results good

## 2024-08-28 PROBLEM — F43.20 ADJUSTMENT DISORDER: Status: ACTIVE | Noted: 2024-08-28

## 2024-08-28 NOTE — ASSESSMENT & PLAN NOTE
Patient's mother, who was his primary caregiver recently passed away.  Referral for Psychiatry placed.

## 2024-08-28 NOTE — PROGRESS NOTES
Patient Name: Dallas Lawson Jr.    : 1965  MRN: [id number]      Subjective:     Patient ID: Dallas is a 58 y.o. male    Chief Complaint:  Follow-up    History of Present Illness  The patient is a 58-year-old male who presents for evaluation of multiple medical concerns. He is accompanied by his cousin.    He has been experiencing a persistent cough throughout the year, which was previously discussed in 2024. His cousin reports that he has been using Flonase to manage this condition.    His cousin is seeking assistance with training him to use a cane due to his visual impairment. She plans to contact [facility] to inquire about potential programs and is interested in exploring any available programs through [facility] that could provide additional support. He is scheduled for an assessment at [facility] to determine his eligibility for their programs. He saw his eye doctor on Wednesday and has an appointment with him again in 6 weeks.    She is also considering counseling services for him, as she believes he would benefit from professional mental health support. Her ultimate goal is to help him achieve independent living, if possible. She feels that he has experienced significant challenges throughout his life and could use someone to talk to, especially since the recent passing of his mother. She is also seeking counseling for herself, as she finds her current mental state to be exhausting. She hopes that counseling will help him feel more uplifted and confident.    He is scheduled for a colonoscopy in 10/2024.      Review of Systems   Constitutional:  Negative for unexpected weight change.   HENT:  Negative for sore throat.    Eyes:  Positive for visual disturbance.   Respiratory:  Negative for shortness of breath.    Cardiovascular:  Negative for chest pain.   Gastrointestinal:  Negative for abdominal pain and blood in stool.   Endocrine: Negative for cold intolerance and heat  "intolerance.   Genitourinary:  Negative for dysuria and frequency.   Integumentary:  Negative for rash.   Neurological:  Negative for weakness, numbness and headaches.   Hematological:  Negative for adenopathy.   Psychiatric/Behavioral:  Negative for suicidal ideas.         Objective:   /82 (BP Location: Right arm, Patient Position: Sitting, BP Method: Medium (Manual))   Pulse 87   Resp 18   Ht 5' 8" (1.727 m)   Wt 99.3 kg (218 lb 14.7 oz)   SpO2 99%   BMI 33.29 kg/m²     Physical Exam  Vital Signs  Blood pressure reading is 126/82.  Physical Exam  Vitals reviewed.   Constitutional:       General: He is not in acute distress.  HENT:      Head: Normocephalic and atraumatic.      Right Ear: Ear canal and external ear normal.      Left Ear: Ear canal and external ear normal.      Nose: Nose normal.      Mouth/Throat:      Mouth: Mucous membranes are moist.      Pharynx: No oropharyngeal exudate or posterior oropharyngeal erythema.   Eyes:      Extraocular Movements: Extraocular movements intact.      Conjunctiva/sclera: Conjunctivae normal.      Pupils: Pupils are equal, round, and reactive to light.   Cardiovascular:      Rate and Rhythm: Normal rate and regular rhythm.      Pulses: Normal pulses.      Heart sounds: Normal heart sounds.   Pulmonary:      Effort: Pulmonary effort is normal. No respiratory distress.      Breath sounds: No wheezing or rales.   Abdominal:      General: Abdomen is flat. Bowel sounds are normal. There is no distension.      Palpations: Abdomen is soft.      Tenderness: There is no abdominal tenderness. There is no guarding.   Musculoskeletal:      Cervical back: Normal range of motion. No rigidity or tenderness.   Lymphadenopathy:      Cervical: No cervical adenopathy.   Skin:     General: Skin is warm.      Capillary Refill: Capillary refill takes less than 2 seconds.   Neurological:      Mental Status: He is alert and oriented to person, place, and time.      Cranial Nerves: " Cranial nerve deficit (blind on both sides) present.      Sensory: No sensory deficit.   Psychiatric:         Mood and Affect: Mood normal.         Behavior: Behavior normal.            Assessment        ICD-10-CM ICD-9-CM   1. Type 2 diabetes mellitus with obesity  E11.69 250.00    E66.9 278.00   2. Dyslipidemia  E78.5 272.4   3. Adjustment disorder, unspecified type  F43.20 309.9   4. Chronic rhinitis  J31.0 472.0   5. Nasal septal deviation  J34.2 470   6. Problem related to health literacy  Z55.6 V49.89   7. Blind in both eyes  H54.3 369.00         Plan:     Assessment & Plan  1. Chronic Rhinitis.  He has been experiencing chronic congestion throughout the year. A video demonstrating the correct use of Flonase was provided to his cousin. Prescriptions for Flonase and Claritin were renewed and sent to [facility].    2. Diabetes Mellitus.  Blood work will be conducted today to monitor his diabetes. He is advised to continue his current medication regimen.    3. Hyperlipidemia.  Blood work will be conducted today to monitor his cholesterol levels. He is advised to continue his current medication regimen.    4. Hypertension.  His blood pressure was slightly elevated initially but improved upon recheck to 126/82. He is advised to monitor his blood pressure regularly.    5. Mental Health Support.  A referral to a  was made to provide additional support, and the contact number for the behavioral health department was provided for scheduling purposes. Counseling services were recommended to help him cope with recent stressors and emotional challenges.    6. Health Maintenance.  He is scheduled for a colonoscopy in October. Instructions regarding the preparation for the procedure, including dietary restrictions and medications like Colace, will be mailed to him. He saw the eye doctor recently and has a follow-up appointment in October to monitor eye pressure.         ORDERS  1. Type 2  diabetes mellitus with obesity  Overview:  Lab Results   Component Value Date    HGBA1C 5.6 08/16/2024    HGBA1C 6.0 (H) 03/22/2024    HGBA1C 6.2 (H) 08/10/2023     Lab Results   Component Value Date    LDLCALC 82.0 08/16/2024     Lab Results   Component Value Date    MICALBCREAT 3.4 03/22/2024       Assessment & Plan:  A1c is at goal.  Continue current medical regimen of metformin 500 milligrams daily.    Orders:  -     CBC auto differential; Future; Expected date: 11/16/2024  -     Comprehensive metabolic panel; Future; Expected date: 11/16/2024  -     Hemoglobin A1c; Future; Expected date: 11/16/2024  -     Lipid panel; Future; Expected date: 11/16/2024    2. Dyslipidemia  Overview:  Lab Results   Component Value Date    CHOL 156 08/16/2024    CHOL 142 03/22/2024    CHOL 180 08/10/2023     Lab Results   Component Value Date    HDL 60 08/16/2024    HDL 48 03/22/2024    HDL 46 08/10/2023     Lab Results   Component Value Date    LDLCALC 82.0 08/16/2024    LDLCALC 77.6 03/22/2024    LDLCALC 118.4 08/10/2023     Lab Results   Component Value Date    TRIG 70 08/16/2024    TRIG 82 03/22/2024    TRIG 78 08/10/2023     Lab Results   Component Value Date    CHOLHDL 38.5 08/16/2024    CHOLHDL 33.8 03/22/2024    CHOLHDL 25.6 08/10/2023      The 10-year ASCVD risk score (Taran CASTILLO, et al., 2019) is: 11.5%    Values used to calculate the score:      Age: 58 years      Sex: Male      Is Non- : Yes      Diabetic: Yes      Tobacco smoker: No      Systolic Blood Pressure: 126 mmHg      Is BP treated: No      HDL Cholesterol: 60 mg/dL      Total Cholesterol: 156 mg/dL      Assessment & Plan:  Continue atorvastatin 2 grams daily    Orders:  -     CBC auto differential; Future; Expected date: 11/16/2024  -     Comprehensive metabolic panel; Future; Expected date: 11/16/2024  -     Lipid panel; Future; Expected date: 11/16/2024    3. Adjustment disorder, unspecified type  Assessment & Plan:  Patient's mother,  who was his primary caregiver recently passed away.  Referral for Psychiatry placed.    Orders:  -     Ambulatory referral/consult to Psychiatry; Future; Expected date: 08/23/2024  -     Ambulatory referral/consult to Outpatient Case Management    4. Chronic rhinitis  -     fluticasone propionate (FLONASE) 50 mcg/actuation nasal spray; 2 sprays (100 mcg total) by Each Nostril route once daily.  Dispense: 16 mL; Refill: 11  -     loratadine (CLARITIN) 10 mg tablet; Take 1 tablet (10 mg total) by mouth once daily.  Dispense: 90 tablet; Refill: 3    5. Nasal septal deviation  -     fluticasone propionate (FLONASE) 50 mcg/actuation nasal spray; 2 sprays (100 mcg total) by Each Nostril route once daily.  Dispense: 16 mL; Refill: 11    6. Problem related to health literacy  -     Ambulatory referral/consult to Outpatient Case Management    7. Blind in both eyes  -     Ambulatory referral/consult to Outpatient Case Management             -Ananda Kwong Jr., MD, AAHIVS      This note was generated with the assistance of ambient listening technology. Verbal consent was obtained by the patient and accompanying visitor(s) for the recording of patient appointment to facilitate this note. I attest to having reviewed and edited the generated note for accuracy, though some syntax or spelling errors may persist. Please contact the author of this note for any clarification.          Patient Instructions   Please call  [phone]( [facility]) to schedule your behavioral health/mental health appointment      Follow Up  Follow up in about 4 months (around 12/16/2024).    Future Appointments   Date Time Provider Department Center   10/10/2024  9:20 AM Ramy Krishnan OD [facility] [place]   12/6/2024  8:45 AM LAB, [facility]   12/12/2024 10:40 AM Ananda Kwong Jr., MD [facility]

## 2024-09-10 DIAGNOSIS — J34.2 NASAL SEPTAL DEVIATION: ICD-10-CM

## 2024-09-10 RX ORDER — AZELASTINE 1 MG/ML
1 SPRAY, METERED NASAL 2 TIMES DAILY
Qty: 60 ML | Refills: 11 | Status: SHIPPED | OUTPATIENT
Start: 2024-09-10

## 2024-09-10 NOTE — TELEPHONE ENCOUNTER
Called patient and provided psychiatry's department phone number and instructed to reach back out if there were any issues

## 2024-09-10 NOTE — TELEPHONE ENCOUNTER
No care due was identified.  [facility] Embedded Care Due Messages. Reference number: [id number].   9/10/2024 11:19:00 AM CDT

## 2024-09-10 NOTE — TELEPHONE ENCOUNTER
----- Message from Belinda Ward sent at 9/10/2024  2:57 PM CDT -----  Regarding: Therapist order  Pt is requesting a order to speak to a therapist.        [phone]   no

## 2024-09-10 NOTE — TELEPHONE ENCOUNTER
Refill Routing Note   Medication(s) are not appropriate for processing by [facility] for the following reason(s):        New or recently adjusted medication    [facility] action(s):  Defer             Appointments  past 12m or future 3m with PCP    Date Provider   Last Visit   8/16/2024 Ananda Kwong Jr., MD   Next Visit   12/12/2024 Ananda Kwong Jr., MD   ED visits in past 90 days: 0        Note composed:2:55 PM 09/10/2024

## 2024-10-08 ENCOUNTER — TELEPHONE (OUTPATIENT)
Dept: OPTOMETRY | Facility: CLINIC | Age: 59
End: 2024-10-08
Payer: MEDICARE

## 2024-10-10 ENCOUNTER — TELEPHONE (OUTPATIENT)
Dept: OPTOMETRY | Facility: CLINIC | Age: 59
End: 2024-10-10
Payer: MEDICARE

## 2024-10-10 NOTE — TELEPHONE ENCOUNTER
----- Message from Kaylin sent at 10/10/2024 10:19 AM CDT -----  Regarding: Fax Inquiry  Type: Fax Confirmation    Who Called:Chase    Would the patient rather a call back or a response via [facility]? Call [facility]    Best Call Back Number: [phone]    Additional Information: Patient called to confirm fax from [facility] was received today.

## 2024-10-11 ENCOUNTER — TELEPHONE (OUTPATIENT)
Dept: FAMILY MEDICINE | Facility: CLINIC | Age: 59
End: 2024-10-11
Payer: MEDICARE

## 2024-10-11 NOTE — TELEPHONE ENCOUNTER
----- Message from Alicja sent at 10/11/2024  4:31 PM CDT -----  Regarding: call back  Type: Patient Call Back    Who called: pt     What is the request in detail: requesting orders for a PSA lab test and a call to schedule     Can the clinic reply by [facility]?no    Would the patient rather a call back or a response via [facility]? call    Best call back number: [phone]     Additional Information:

## 2024-10-16 ENCOUNTER — TELEPHONE (OUTPATIENT)
Dept: ENDOSCOPY | Facility: HOSPITAL | Age: 59
End: 2024-10-16
Payer: MEDICARE

## 2024-10-16 DIAGNOSIS — Z12.11 SPECIAL SCREENING FOR MALIGNANT NEOPLASMS, COLON: Primary | ICD-10-CM

## 2024-10-16 RX ORDER — SODIUM, POTASSIUM,MAG SULFATES 17.5-3.13G
1 SOLUTION, RECONSTITUTED, ORAL ORAL DAILY
Qty: 1 KIT | Refills: 0 | Status: SHIPPED | OUTPATIENT
Start: 2024-10-16 | End: 2024-10-18

## 2024-10-16 NOTE — TELEPHONE ENCOUNTER
Spoke to patient's cousin, Shari, to reschedule procedure(s) Colonoscopy       Physician to perform procedure(s) Dr. BELA Negron  Date of Procedure (s) 11/9/24  Arrival Time 8:00 AM  Time of Procedure(s) 9:00 AM   Location of Procedure(s) [place] 2nd Floor  Type of Rx Prep sent to patient: Suprep  Instructions provided to patient via [facility]    Patient was informed on the following information and verbalized understanding. Screening questionnaire reviewed with patient and complete. If procedure requires anesthesia, a responsible adult needs to be present to accompany the patient home, patient cannot drive after receiving anesthesia. Appointment details are tentative, especially check-in time. Patient will receive a prep-op call 7 days prior to confirm check-in time for procedure. If applicable the patient should contact their pharmacy to verify Rx for procedure prep is ready for pick-up. Patient was advised to call the scheduling department at [phone] if pharmacy states no Rx is available. Patient was advised to call the endoscopy scheduling department if any questions or concerns arise.       Endoscopy Scheduling Department

## 2024-10-16 NOTE — TELEPHONE ENCOUNTER
Received phone call from Ms. Shari Pichardo, patient's cousin. Patient gave permission to speak to Ms. Mccarthy regarding his care and appointments.  Patient's colonoscopy rescheduled.

## 2024-10-17 NOTE — TELEPHONE ENCOUNTER
Spoke with Dallas who has c/o urinary urgency. Appointment scheduled for 10/21/2024 with KAMLESH Richards NP.

## 2024-10-17 NOTE — TELEPHONE ENCOUNTER
Patient is not due for screening PSA tests as he had it earlier this year. If he is having prostate concerns then should schedule an appointment

## 2024-10-21 ENCOUNTER — OFFICE VISIT (OUTPATIENT)
Dept: FAMILY MEDICINE | Facility: CLINIC | Age: 59
End: 2024-10-21
Payer: MEDICARE

## 2024-10-21 ENCOUNTER — LAB VISIT (OUTPATIENT)
Dept: LAB | Facility: HOSPITAL | Age: 59
End: 2024-10-21
Attending: FAMILY MEDICINE
Payer: MEDICARE

## 2024-10-21 VITALS
OXYGEN SATURATION: 96 % | BODY MASS INDEX: 33.18 KG/M2 | SYSTOLIC BLOOD PRESSURE: 114 MMHG | HEART RATE: 84 BPM | WEIGHT: 218.94 LBS | DIASTOLIC BLOOD PRESSURE: 66 MMHG | TEMPERATURE: 100 F | HEIGHT: 68 IN

## 2024-10-21 DIAGNOSIS — R35.0 URINARY FREQUENCY: Primary | ICD-10-CM

## 2024-10-21 DIAGNOSIS — E66.9 TYPE 2 DIABETES MELLITUS WITH OBESITY: ICD-10-CM

## 2024-10-21 DIAGNOSIS — R35.0 URINARY FREQUENCY: ICD-10-CM

## 2024-10-21 DIAGNOSIS — E78.5 DYSLIPIDEMIA: ICD-10-CM

## 2024-10-21 DIAGNOSIS — E11.69 TYPE 2 DIABETES MELLITUS WITH OBESITY: ICD-10-CM

## 2024-10-21 PROCEDURE — 3008F BODY MASS INDEX DOCD: CPT | Mod: HCNC,CPTII,S$GLB,

## 2024-10-21 PROCEDURE — 3066F NEPHROPATHY DOC TX: CPT | Mod: HCNC,CPTII,S$GLB,

## 2024-10-21 PROCEDURE — 3078F DIAST BP <80 MM HG: CPT | Mod: HCNC,CPTII,S$GLB,

## 2024-10-21 PROCEDURE — 3074F SYST BP LT 130 MM HG: CPT | Mod: HCNC,CPTII,S$GLB,

## 2024-10-21 PROCEDURE — 1160F RVW MEDS BY RX/DR IN RCRD: CPT | Mod: HCNC,CPTII,S$GLB,

## 2024-10-21 PROCEDURE — 36415 COLL VENOUS BLD VENIPUNCTURE: CPT | Mod: HCNC,PN

## 2024-10-21 PROCEDURE — 84153 ASSAY OF PSA TOTAL: CPT | Mod: HCNC

## 2024-10-21 PROCEDURE — 3061F NEG MICROALBUMINURIA REV: CPT | Mod: HCNC,CPTII,S$GLB,

## 2024-10-21 PROCEDURE — 1159F MED LIST DOCD IN RCRD: CPT | Mod: HCNC,CPTII,S$GLB,

## 2024-10-21 PROCEDURE — 99999 PR PBB SHADOW E&M-EST. PATIENT-LVL III: CPT | Mod: PBBFAC,HCNC,,

## 2024-10-21 PROCEDURE — 81000 URINALYSIS NONAUTO W/SCOPE: CPT | Mod: HCNC

## 2024-10-21 PROCEDURE — 87086 URINE CULTURE/COLONY COUNT: CPT | Mod: HCNC

## 2024-10-21 PROCEDURE — 99213 OFFICE O/P EST LOW 20 MIN: CPT | Mod: HCNC,S$GLB,,

## 2024-10-21 PROCEDURE — 3044F HG A1C LEVEL LT 7.0%: CPT | Mod: HCNC,CPTII,S$GLB,

## 2024-10-21 RX ORDER — TAMSULOSIN HYDROCHLORIDE 0.4 MG/1
CAPSULE ORAL
Qty: 90 CAPSULE | Refills: 0 | Status: SHIPPED | OUTPATIENT
Start: 2024-10-21

## 2024-10-21 RX ORDER — TAMSULOSIN HYDROCHLORIDE 0.4 MG/1
CAPSULE ORAL
Qty: 90 CAPSULE | Refills: 0 | Status: SHIPPED | OUTPATIENT
Start: 2024-10-21 | End: 2024-10-21

## 2024-10-21 NOTE — PROGRESS NOTES
HPI     Dallas Lawson Jr. is a 58 y.o. male with multiple medical diagnoses as listed in the medical history and problem list that presents for   Chief Complaint   Patient presents with    Urinary Frequency     For at least 1 month, if it takes longer to get to bathroom patient reports the stream seems weaker & like he can't empty his bladder all the way.       HPI  Patient presents to clinic with family member.Le Reports going to the bathroom urgency and frequency around 2-3 AM. He feels like he's not emptying his bladder fully, but it feels like something is holding him back from fully emptying. Denies hematuria, dysuria, fevers, chills, abdominal pain, back pain, leaking. Patient denies daytime sleepiness and is unsure if he snores at night.    IPSS Questionnaire (AUA-7):  Over the past month    1)  How often have you had a sensation of not emptying your bladder completely after you finish urinating?  3 - About half the time   2)  How often have you had to urinate again less than two hours after you finished urinating? 2 - Less than half the time   3)  How often have you found you stopped and started again several times when you urinated?  3 - About half the time   4) How difficult have you found it to postpone urination?  0 - Not at all   5) How often have you had a weak urinary stream?  3 - About half the time   6) How often have you had to push or strain to begin urination?  3 - About half the time   7) How many times did you most typically get up to urinate from the time you went to bed until the time you got up in the morning?  3 - 3 times   Total score:  0-7 mildly symptomatic    8-19 moderately symptomatic    20-35 severely symptomatic   Total score: 17 - moderately symptomatic    Assessment & Plan     Problem List Items Addressed This Visit          Cardiac/Vascular    Dyslipidemia (Chronic)    Overview     Lab Results   Component Value Date    CHOL 156 08/16/2024    CHOL 142 03/22/2024    CHOL 180  08/10/2023     Lab Results   Component Value Date    HDL 60 08/16/2024    HDL 48 03/22/2024    HDL 46 08/10/2023     Lab Results   Component Value Date    LDLCALC 82.0 08/16/2024    LDLCALC 77.6 03/22/2024    LDLCALC 118.4 08/10/2023     Lab Results   Component Value Date    TRIG 70 08/16/2024    TRIG 82 03/22/2024    TRIG 78 08/10/2023     Lab Results   Component Value Date    CHOLHDL 38.5 08/16/2024    CHOLHDL 33.8 03/22/2024    CHOLHDL 25.6 08/10/2023      The 10-year ASCVD risk score (Taran CASTILLO, et al., 2019) is: 11.5%    Values used to calculate the score:      Age: 58 years      Sex: Male      Is Non- : Yes      Diabetic: Yes      Tobacco smoker: No      Systolic Blood Pressure: 126 mmHg      Is BP treated: No      HDL Cholesterol: 60 mg/dL      Total Cholesterol: 156 mg/dL      - Continue with current statin regimen.             Endocrine    Type 2 diabetes mellitus with obesity (Chronic)    Overview     Lab Results   Component Value Date    HGBA1C 5.6 08/16/2024    HGBA1C 6.0 (H) 03/22/2024    HGBA1C 6.2 (H) 08/10/2023     Lab Results   Component Value Date    LDLCALC 82.0 08/16/2024     Lab Results   Component Value Date    MICALBCREAT 3.4 03/22/2024     - Stable, continue with current Metformin regimen.        Other Visit Diagnoses       Urinary frequency    -  Primary    Relevant Medications    tamsulosin (FLOMAX) 0.4 mg Cap    Other Relevant Orders    PROSTATE SPECIFIC ANTIGEN, DIAGNOSTIC    CULTURE, URINE    Urinalysis    - Discussed with PCP regarding patient's symptoms, treatment options, and his AUA-7 + test. Likely due to BPH, but sent out urine for UA/culture to rule out infections.   - Instructed to take Flomax once daily 30 minutes after a meal and to report back for worsening symptoms.  - Will follow up with PCP in 6 weeks and can consider Urology referral if needed.              --------------------------------------------      Health Maintenance:  Health Maintenance          Date Due Completion Date    Colorectal Cancer Screening Never done ---    Shingles Vaccine (1 of 2) Never done ---    Influenza Vaccine (1) 09/01/2024 10/4/2022    COVID-19 Vaccine (5 - 2024-25 season) 09/01/2024 10/4/2022    Hemoglobin A1c 02/16/2025 8/16/2024    PROSTATE-SPECIFIC ANTIGEN 03/22/2025 3/22/2024    Diabetes Urine Screening 03/22/2025 3/22/2024    Foot Exam 06/06/2025 6/6/2024    Eye Exam 08/14/2025 8/14/2024    Lipid Panel 08/16/2025 8/16/2024    Low Dose Statin 08/28/2025 8/28/2024    TETANUS VACCINE 07/12/2034 7/12/2024    RSV Vaccine (Age 60+ and Pregnant patients) (1 - 1-dose 75+ series) 12/12/2041 ---            Health maintenance reviewed    Follow Up:  No follow-ups on file.    Exam     Review of Systems:  (as noted above)  Review of Systems   Constitutional:  Negative for chills, fatigue and fever.   HENT:  Negative for congestion, ear discharge, ear pain and hearing loss.         Bilateral ear itching   Eyes:         Legally blind   Respiratory:  Negative for cough and chest tightness.    Cardiovascular:  Negative for chest pain, palpitations and leg swelling.   Gastrointestinal:  Negative for abdominal pain and nausea.   Genitourinary:  Positive for difficulty urinating, frequency and urgency. Negative for dysuria, flank pain, hematuria, penile discharge and testicular pain.   Musculoskeletal:  Negative for back pain, gait problem and myalgias.   Skin:  Negative for color change.   Neurological:  Negative for dizziness, light-headedness and headaches.   Psychiatric/Behavioral:  The patient is not nervous/anxious.        Physical Exam:   Physical Exam  Constitutional:       Appearance: Normal appearance.   HENT:      Head: Normocephalic.      Right Ear: Hearing and tympanic membrane normal. There is impacted cerumen.      Left Ear: Hearing and tympanic membrane normal. There is impacted cerumen.      Ears:      Comments: Dried blood and flaky scales on bilateral external ear canal.      "Nose: Nose normal.   Eyes:      Conjunctiva/sclera: Conjunctivae normal.   Pulmonary:      Effort: Pulmonary effort is normal.   Musculoskeletal:      Cervical back: Normal range of motion.   Skin:     General: Skin is warm and dry.   Neurological:      Mental Status: He is alert and oriented to person, place, and time.   Psychiatric:         Mood and Affect: Mood normal.         Behavior: Behavior normal.       Vitals:    10/21/24 1549   BP: 114/66   Pulse: 84   Temp: 99.7 °F (37.6 °C)   TempSrc: Oral   SpO2: 96%   Weight: 99.3 kg (218 lb 14.7 oz)   Height: 5' 8" (1.727 m)      Body mass index is 33.29 kg/m².        History     Past Medical History:  Past Medical History:   Diagnosis Date    Diabetes mellitus type I        Past Surgical History:  History reviewed. No pertinent surgical history.    Social History:  Social History     Socioeconomic History    Marital status: Unknown   Tobacco Use    Smoking status: Former     Current packs/day: 0.00     Types: Cigarettes     Quit date: 2023     Years since quittin.6   Substance and Sexual Activity    Alcohol use: No    Drug use: No     Social Drivers of Health     Financial Resource Strain: Low Risk  (2024)    Overall Financial Resource Strain (CARDIA)     Difficulty of Paying Living Expenses: Not very hard   Recent Concern: Financial Resource Strain - High Risk (2024)    Overall Financial Resource Strain (CARDIA)     Difficulty of Paying Living Expenses: Hard   Food Insecurity: No Food Insecurity (2024)    Hunger Vital Sign     Worried About Running Out of Food in the Last Year: Never true     Ran Out of Food in the Last Year: Never true   Recent Concern: Food Insecurity - Food Insecurity Present (2024)    Hunger Vital Sign     Worried About Running Out of Food in the Last Year: Sometimes true     Ran Out of Food in the Last Year: Sometimes true   Transportation Needs: Unmet Transportation Needs (2024)    PRAPARE - Transportation    "  Lack of Transportation (Medical): Yes     Lack of Transportation (Non-Medical): Yes   Physical Activity: Insufficiently Active (8/14/2024)    Exercise Vital Sign     Days of Exercise per Week: 3 days     Minutes of Exercise per Session: 20 min   Stress: Stress Concern Present (8/14/2024)    [employer] - Occupational Stress Questionnaire     Feeling of Stress : To some extent   Housing Stability: Low Risk  (8/14/2024)    Housing Stability Vital Sign     Unable to Pay for Housing in the Last Year: No     Homeless in the Last Year: No       Family History:  No family history on file.    Allergies and Medications: (updated and reviewed)  Review of patient's allergies indicates:  No Known Allergies  Current Outpatient Medications   Medication Sig Dispense Refill    atorvastatin (LIPITOR) 20 MG tablet Take 1 tablet (20 mg total) by mouth once daily. 90 tablet 2    azelastine (ASTELIN) 137 mcg (0.1 %) nasal spray USE 1 SPRAY NASALLY TWICE DAILY 60 mL 11    dorzolamide-timolol 2-0.5% (COSOPT) 22.3-6.8 mg/mL ophthalmic solution Place 1 drop into both eyes 2 (two) times daily. 30 mL 3    fluticasone propionate (FLONASE) 50 mcg/actuation nasal spray 2 sprays (100 mcg total) by Each Nostril route once daily. 16 mL 11    loratadine (CLARITIN) 10 mg tablet Take 1 tablet (10 mg total) by mouth once daily. 90 tablet 3    metFORMIN (GLUCOPHAGE) 500 MG tablet Take 1 tablet (500 mg total) by mouth 2 (two) times daily with meals. 180 tablet 2    multivitamin (THERAGRAN) per tablet Take 1 tablet by mouth once daily. Centrum 50 for men      omeprazole (PRILOSEC) 20 MG capsule TAKE 1 CAPSULE ONE TIME DAILY 90 capsule 3    traZODone (DESYREL) 50 MG tablet Take 1 tablet (50 mg total) by mouth every evening. 90 tablet 2    BOOSTRIX TDAP 2.5-8-5 Lf-mcg-Lf/0.5mL Syrg injection       PREVNAR 20, PF, 0.5 mL Syrg injection       tamsulosin (FLOMAX) 0.4 mg Cap Take 1 capsule daily (0.4 mg total) by mouth once daily  after a meal 90 capsule 0     No current facility-administered medications for this visit.       Patient Care Team:  Ananda Kwong Jr., MD as PCP - General (Family Medicine)  Miah uHnt III, MD as Consulting Physician (Ophthalmology)         - The patient is given an After Visit Summary that lists all medications with directions, allergies, education, orders placed during this encounter and follow-up instructions.      - I have reviewed the patient's medical information including past medical, family, and social history sections including the medications and allergies.      - We discussed the patient's current medications.          Mello Richards NP

## 2024-10-22 LAB
AMORPH CRY URNS QL MICRO: ABNORMAL
BACTERIA #/AREA URNS HPF: ABNORMAL /HPF
BILIRUB UR QL STRIP: NEGATIVE
CAOX CRY URNS QL MICRO: ABNORMAL
CLARITY UR: ABNORMAL
COLOR UR: ABNORMAL
GLUCOSE UR QL STRIP: ABNORMAL
HGB UR QL STRIP: NEGATIVE
HYALINE CASTS #/AREA URNS LPF: 0 /LPF
KETONES UR QL STRIP: NEGATIVE
LEUKOCYTE ESTERASE UR QL STRIP: NEGATIVE
MICROSCOPIC COMMENT: ABNORMAL
NITRITE UR QL STRIP: NEGATIVE
PH UR STRIP: 6 [PH] (ref 5–8)
PROT UR QL STRIP: ABNORMAL
RBC #/AREA URNS HPF: 2 /HPF (ref 0–4)
SP GR UR STRIP: >1.03 (ref 1–1.03)
URN SPEC COLLECT METH UR: ABNORMAL
UROBILINOGEN UR STRIP-ACNC: >=8 EU/DL
WBC #/AREA URNS HPF: 0 /HPF (ref 0–5)

## 2024-10-23 LAB — COMPLEXED PSA SERPL-MCNC: 0.68 NG/ML (ref 0–4)

## 2024-10-24 LAB
BACTERIA UR CULT: NORMAL
BACTERIA UR CULT: NORMAL

## 2024-11-04 ENCOUNTER — TELEPHONE (OUTPATIENT)
Dept: ENDOSCOPY | Facility: HOSPITAL | Age: 59
End: 2024-11-04
Payer: MEDICARE

## 2024-11-04 NOTE — TELEPHONE ENCOUNTER
Telephoned pt for precall for Colonoscopy on 1/9/24.  Spoke with pt's cousin/caregiver, Shari, and she states the procedure will need to be cancelled at this time as she has a schedule conflict and will not be available on this date to bring him.  She is on a work call and states she will call back to reschedule.  Endoscopy Scheduling number provided.